# Patient Record
Sex: MALE | Race: WHITE | Employment: FULL TIME | URBAN - METROPOLITAN AREA
[De-identification: names, ages, dates, MRNs, and addresses within clinical notes are randomized per-mention and may not be internally consistent; named-entity substitution may affect disease eponyms.]

---

## 2022-02-24 ENCOUNTER — APPOINTMENT (OUTPATIENT)
Dept: GENERAL RADIOLOGY | Age: 72
DRG: 521 | End: 2022-02-24
Payer: COMMERCIAL

## 2022-02-24 ENCOUNTER — HOSPITAL ENCOUNTER (INPATIENT)
Age: 72
LOS: 3 days | Discharge: HOME HEALTH | DRG: 521 | End: 2022-02-27
Attending: INTERNAL MEDICINE | Admitting: INTERNAL MEDICINE
Payer: COMMERCIAL

## 2022-02-24 DIAGNOSIS — S72.002A CLOSED DISPLACED FRACTURE OF LEFT FEMORAL NECK (HCC): ICD-10-CM

## 2022-02-24 DIAGNOSIS — Z96.642 S/P HIP REPLACEMENT, LEFT: Primary | ICD-10-CM
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
## 2022-02-24 PROBLEM — N18.30 CKD (CHRONIC KIDNEY DISEASE), STAGE III (HCC): Status: ACTIVE | Noted: 2022-02-24

## 2022-02-24 PROBLEM — E11.9 TYPE 2 DIABETES MELLITUS (HCC): Status: ACTIVE | Noted: 2022-02-24

## 2022-02-24 PROBLEM — I10 ESSENTIAL HYPERTENSION: Status: ACTIVE | Noted: 2022-02-24

## 2022-02-24 LAB
ABO/RH: NORMAL
ANION GAP SERPL CALCULATED.3IONS-SCNC: 18 MMOL/L (ref 3–16)
ANTIBODY SCREEN: NORMAL
APTT: 33.2 SEC (ref 26.2–38.6)
BASOPHILS ABSOLUTE: 0.1 K/UL (ref 0–0.2)
BASOPHILS RELATIVE PERCENT: 0.4 %
BUN BLDV-MCNC: 38 MG/DL (ref 7–20)
CALCIUM SERPL-MCNC: 8.7 MG/DL (ref 8.3–10.6)
CHLORIDE BLD-SCNC: 101 MMOL/L (ref 99–110)
CO2: 19 MMOL/L (ref 21–32)
CREAT SERPL-MCNC: 1.9 MG/DL (ref 0.8–1.3)
EOSINOPHILS ABSOLUTE: 0.1 K/UL (ref 0–0.6)
EOSINOPHILS RELATIVE PERCENT: 0.8 %
GFR AFRICAN AMERICAN: 43
GFR NON-AFRICAN AMERICAN: 35
GLUCOSE BLD-MCNC: 164 MG/DL (ref 70–99)
HCT VFR BLD CALC: 33.5 % (ref 40.5–52.5)
HEMOGLOBIN: 11.4 G/DL (ref 13.5–17.5)
INR BLD: 0.97 (ref 0.88–1.12)
LYMPHOCYTES ABSOLUTE: 0.9 K/UL (ref 1–5.1)
LYMPHOCYTES RELATIVE PERCENT: 6.6 %
MCH RBC QN AUTO: 31.5 PG (ref 26–34)
MCHC RBC AUTO-ENTMCNC: 34.1 G/DL (ref 31–36)
MCV RBC AUTO: 92.4 FL (ref 80–100)
MONOCYTES ABSOLUTE: 0.9 K/UL (ref 0–1.3)
MONOCYTES RELATIVE PERCENT: 6.3 %
NEUTROPHILS ABSOLUTE: 12.2 K/UL (ref 1.7–7.7)
NEUTROPHILS RELATIVE PERCENT: 85.9 %
PDW BLD-RTO: 12.9 % (ref 12.4–15.4)
PLATELET # BLD: 231 K/UL (ref 135–450)
PMV BLD AUTO: 7.6 FL (ref 5–10.5)
POTASSIUM REFLEX MAGNESIUM: 4.7 MMOL/L (ref 3.5–5.1)
PROTHROMBIN TIME: 11 SEC (ref 9.9–12.7)
RBC # BLD: 3.62 M/UL (ref 4.2–5.9)
SARS-COV-2, NAAT: NOT DETECTED
SODIUM BLD-SCNC: 138 MMOL/L (ref 136–145)
WBC # BLD: 14.2 K/UL (ref 4–11)

## 2022-02-24 PROCEDURE — 96374 THER/PROPH/DIAG INJ IV PUSH: CPT

## 2022-02-24 PROCEDURE — 87635 SARS-COV-2 COVID-19 AMP PRB: CPT

## 2022-02-24 PROCEDURE — 86900 BLOOD TYPING SEROLOGIC ABO: CPT

## 2022-02-24 PROCEDURE — 85025 COMPLETE CBC W/AUTO DIFF WBC: CPT

## 2022-02-24 PROCEDURE — 99222 1ST HOSP IP/OBS MODERATE 55: CPT | Performed by: ORTHOPAEDIC SURGERY

## 2022-02-24 PROCEDURE — 80048 BASIC METABOLIC PNL TOTAL CA: CPT

## 2022-02-24 PROCEDURE — 2580000003 HC RX 258: Performed by: INTERNAL MEDICINE

## 2022-02-24 PROCEDURE — 96375 TX/PRO/DX INJ NEW DRUG ADDON: CPT

## 2022-02-24 PROCEDURE — 71045 X-RAY EXAM CHEST 1 VIEW: CPT

## 2022-02-24 PROCEDURE — 85730 THROMBOPLASTIN TIME PARTIAL: CPT

## 2022-02-24 PROCEDURE — 93005 ELECTROCARDIOGRAM TRACING: CPT | Performed by: INTERNAL MEDICINE

## 2022-02-24 PROCEDURE — 6360000002 HC RX W HCPCS: Performed by: INTERNAL MEDICINE

## 2022-02-24 PROCEDURE — 36415 COLL VENOUS BLD VENIPUNCTURE: CPT

## 2022-02-24 PROCEDURE — 73552 X-RAY EXAM OF FEMUR 2/>: CPT

## 2022-02-24 PROCEDURE — 6360000002 HC RX W HCPCS

## 2022-02-24 PROCEDURE — 6360000002 HC RX W HCPCS: Performed by: PHYSICIAN ASSISTANT

## 2022-02-24 PROCEDURE — 1200000000 HC SEMI PRIVATE

## 2022-02-24 PROCEDURE — 99284 EMERGENCY DEPT VISIT MOD MDM: CPT

## 2022-02-24 PROCEDURE — 6370000000 HC RX 637 (ALT 250 FOR IP): Performed by: INTERNAL MEDICINE

## 2022-02-24 PROCEDURE — 86901 BLOOD TYPING SEROLOGIC RH(D): CPT

## 2022-02-24 PROCEDURE — 73502 X-RAY EXAM HIP UNI 2-3 VIEWS: CPT

## 2022-02-24 PROCEDURE — 86850 RBC ANTIBODY SCREEN: CPT

## 2022-02-24 PROCEDURE — 85610 PROTHROMBIN TIME: CPT

## 2022-02-24 RX ORDER — ONDANSETRON 4 MG/1
4 TABLET, ORALLY DISINTEGRATING ORAL EVERY 8 HOURS PRN
Status: DISCONTINUED | OUTPATIENT
Start: 2022-02-24 | End: 2022-02-25

## 2022-02-24 RX ORDER — LOSARTAN POTASSIUM AND HYDROCHLOROTHIAZIDE 12.5; 1 MG/1; MG/1
1 TABLET ORAL DAILY
Status: ON HOLD | COMMUNITY
End: 2022-02-27 | Stop reason: HOSPADM

## 2022-02-24 RX ORDER — ONDANSETRON 2 MG/ML
4 INJECTION INTRAMUSCULAR; INTRAVENOUS EVERY 6 HOURS PRN
Status: DISCONTINUED | OUTPATIENT
Start: 2022-02-24 | End: 2022-02-25

## 2022-02-24 RX ORDER — SODIUM CHLORIDE 0.9 % (FLUSH) 0.9 %
5-40 SYRINGE (ML) INJECTION PRN
Status: DISCONTINUED | OUTPATIENT
Start: 2022-02-24 | End: 2022-02-25

## 2022-02-24 RX ORDER — HYDROCODONE BITARTRATE AND ACETAMINOPHEN 5; 325 MG/1; MG/1
1 TABLET ORAL EVERY 6 HOURS PRN
Status: DISCONTINUED | OUTPATIENT
Start: 2022-02-24 | End: 2022-02-25

## 2022-02-24 RX ORDER — LOSARTAN POTASSIUM 100 MG/1
100 TABLET ORAL DAILY
Status: DISCONTINUED | OUTPATIENT
Start: 2022-02-24 | End: 2022-02-25

## 2022-02-24 RX ORDER — SODIUM CHLORIDE 0.9 % (FLUSH) 0.9 %
5-40 SYRINGE (ML) INJECTION EVERY 12 HOURS SCHEDULED
Status: DISCONTINUED | OUTPATIENT
Start: 2022-02-24 | End: 2022-02-25

## 2022-02-24 RX ORDER — ONDANSETRON 2 MG/ML
4 INJECTION INTRAMUSCULAR; INTRAVENOUS ONCE
Status: COMPLETED | OUTPATIENT
Start: 2022-02-24 | End: 2022-02-24

## 2022-02-24 RX ORDER — FLASH GLUCOSE SENSOR
KIT MISCELLANEOUS
COMMUNITY

## 2022-02-24 RX ORDER — HYDROMORPHONE HYDROCHLORIDE 1 MG/ML
0.25 INJECTION, SOLUTION INTRAMUSCULAR; INTRAVENOUS; SUBCUTANEOUS
Status: DISCONTINUED | OUTPATIENT
Start: 2022-02-24 | End: 2022-02-25

## 2022-02-24 RX ORDER — SITAGLIPTIN AND METFORMIN HYDROCHLORIDE 100; 1000 MG/1; MG/1
100-1000 TABLET, FILM COATED, EXTENDED RELEASE ORAL DAILY
COMMUNITY

## 2022-02-24 RX ORDER — AMLODIPINE BESYLATE 5 MG/1
5 TABLET ORAL DAILY
Status: ON HOLD | COMMUNITY
End: 2022-02-27 | Stop reason: SDUPTHER

## 2022-02-24 RX ORDER — HYDROCHLOROTHIAZIDE 25 MG/1
12.5 TABLET ORAL DAILY
Status: DISCONTINUED | OUTPATIENT
Start: 2022-02-24 | End: 2022-02-25

## 2022-02-24 RX ORDER — SODIUM CHLORIDE 9 MG/ML
25 INJECTION, SOLUTION INTRAVENOUS PRN
Status: DISCONTINUED | OUTPATIENT
Start: 2022-02-24 | End: 2022-02-25

## 2022-02-24 RX ORDER — ACETAMINOPHEN 325 MG/1
650 TABLET ORAL EVERY 6 HOURS PRN
Status: DISCONTINUED | OUTPATIENT
Start: 2022-02-24 | End: 2022-02-25

## 2022-02-24 RX ORDER — ATORVASTATIN CALCIUM 20 MG/1
20 TABLET, FILM COATED ORAL DAILY
Status: DISCONTINUED | OUTPATIENT
Start: 2022-02-24 | End: 2022-02-27 | Stop reason: HOSPADM

## 2022-02-24 RX ORDER — AMLODIPINE BESYLATE 5 MG/1
5 TABLET ORAL DAILY
Status: DISCONTINUED | OUTPATIENT
Start: 2022-02-24 | End: 2022-02-25 | Stop reason: SDUPTHER

## 2022-02-24 RX ORDER — LISINOPRIL 10 MG/1
10 TABLET ORAL DAILY
Status: CANCELLED | OUTPATIENT
Start: 2022-02-24

## 2022-02-24 RX ORDER — HYDROMORPHONE HYDROCHLORIDE 1 MG/ML
0.5 INJECTION, SOLUTION INTRAMUSCULAR; INTRAVENOUS; SUBCUTANEOUS
Status: DISCONTINUED | OUTPATIENT
Start: 2022-02-24 | End: 2022-02-25

## 2022-02-24 RX ORDER — ACETAMINOPHEN 650 MG/1
650 SUPPOSITORY RECTAL EVERY 6 HOURS PRN
Status: DISCONTINUED | OUTPATIENT
Start: 2022-02-24 | End: 2022-02-25

## 2022-02-24 RX ORDER — POLYETHYLENE GLYCOL 3350 17 G/17G
17 POWDER, FOR SOLUTION ORAL DAILY PRN
Status: DISCONTINUED | OUTPATIENT
Start: 2022-02-24 | End: 2022-02-25

## 2022-02-24 RX ORDER — MORPHINE SULFATE 4 MG/ML
4 INJECTION, SOLUTION INTRAMUSCULAR; INTRAVENOUS ONCE
Status: COMPLETED | OUTPATIENT
Start: 2022-02-24 | End: 2022-02-24

## 2022-02-24 RX ADMIN — LOSARTAN POTASSIUM 100 MG: 100 TABLET, FILM COATED ORAL at 15:10

## 2022-02-24 RX ADMIN — MORPHINE SULFATE 4 MG: 4 INJECTION INTRAVENOUS at 09:38

## 2022-02-24 RX ADMIN — HYDROMORPHONE HYDROCHLORIDE 0.5 MG: 1 INJECTION, SOLUTION INTRAMUSCULAR; INTRAVENOUS; SUBCUTANEOUS at 15:13

## 2022-02-24 RX ADMIN — HYDROMORPHONE HYDROCHLORIDE 0.5 MG: 1 INJECTION, SOLUTION INTRAMUSCULAR; INTRAVENOUS; SUBCUTANEOUS at 12:19

## 2022-02-24 RX ADMIN — ONDANSETRON 4 MG: 2 INJECTION INTRAMUSCULAR; INTRAVENOUS at 09:37

## 2022-02-24 RX ADMIN — AMLODIPINE BESYLATE 5 MG: 5 TABLET ORAL at 15:10

## 2022-02-24 RX ADMIN — HYDROMORPHONE HYDROCHLORIDE 0.5 MG: 1 INJECTION, SOLUTION INTRAMUSCULAR; INTRAVENOUS; SUBCUTANEOUS at 18:40

## 2022-02-24 RX ADMIN — ATORVASTATIN CALCIUM 20 MG: 20 TABLET, FILM COATED ORAL at 15:10

## 2022-02-24 RX ADMIN — HYDROCHLOROTHIAZIDE 12.5 MG: 25 TABLET ORAL at 15:10

## 2022-02-24 RX ADMIN — SODIUM CHLORIDE, PRESERVATIVE FREE 10 ML: 5 INJECTION INTRAVENOUS at 21:24

## 2022-02-24 RX ADMIN — HYDROMORPHONE HYDROCHLORIDE 0.5 MG: 1 INJECTION, SOLUTION INTRAMUSCULAR; INTRAVENOUS; SUBCUTANEOUS at 21:23

## 2022-02-24 ASSESSMENT — PAIN DESCRIPTION - ORIENTATION
ORIENTATION: LEFT

## 2022-02-24 ASSESSMENT — ENCOUNTER SYMPTOMS
SHORTNESS OF BREATH: 0
DIARRHEA: 0
CONSTIPATION: 0
ABDOMINAL PAIN: 0
BACK PAIN: 0
VOMITING: 0
NAUSEA: 0
COLOR CHANGE: 0

## 2022-02-24 ASSESSMENT — PAIN - FUNCTIONAL ASSESSMENT: PAIN_FUNCTIONAL_ASSESSMENT: 0-10

## 2022-02-24 ASSESSMENT — PAIN DESCRIPTION - FREQUENCY
FREQUENCY: CONTINUOUS

## 2022-02-24 ASSESSMENT — PAIN DESCRIPTION - DESCRIPTORS
DESCRIPTORS: SHOOTING
DESCRIPTORS: SHOOTING
DESCRIPTORS: ACHING

## 2022-02-24 ASSESSMENT — PAIN SCALES - GENERAL
PAINLEVEL_OUTOF10: 7
PAINLEVEL_OUTOF10: 5
PAINLEVEL_OUTOF10: 3
PAINLEVEL_OUTOF10: 7
PAINLEVEL_OUTOF10: 6
PAINLEVEL_OUTOF10: 7
PAINLEVEL_OUTOF10: 5

## 2022-02-24 ASSESSMENT — PAIN DESCRIPTION - PROGRESSION
CLINICAL_PROGRESSION: GRADUALLY WORSENING
CLINICAL_PROGRESSION: GRADUALLY WORSENING

## 2022-02-24 ASSESSMENT — PAIN DESCRIPTION - ONSET: ONSET: ON-GOING

## 2022-02-24 ASSESSMENT — PAIN DESCRIPTION - LOCATION
LOCATION: HIP

## 2022-02-24 ASSESSMENT — PAIN DESCRIPTION - PAIN TYPE: TYPE: ACUTE PAIN

## 2022-02-24 NOTE — PLAN OF CARE
[address] [facility] Orthopedic Surgery  Plan of Care Note          Orthopedic Consult, full note to follow. Paco Back 80 y.o. admitted for left hip fracture post trip/fall. Xray reviewed, and showed femoral neck/subcapital fracture, high Pauwell's angle    Plan:  - Admitted and cleared by Hospitalist  - NPO at 24h00 tonight, for left KAREEM/Bipolar after hours  - Please add Type/Screen and PTT/INR    Thank you very much for the kind consultation and allowing me to participate in this patient's care. I will continue to keep you apprised of his progress.           I spoke to patient and wife by phone call at   To discuss diagnosis, treatment plan, recovery, risks/benefits  Plan for surgery KAREEM, ANDRZEJ, on 2/25/2022 after Leslie Henry MD , MD 2/24/2022 2:42 PM

## 2022-02-24 NOTE — CONSULTS
[facility] Orthopedic Surgery  Consult Note         This patient is seen in consultation at the request of Dr Naila Coelho MD and YANDY Casanova    Reason for Consult:  Left hip displaced femoral neck fracture. CHIEF COMPLAINT:  Left hip pain/ fall    History Obtained From:  patient, spouse, electronic medical record    HISTORY OF PRESENT ILLNESS:    Mr. Jaswant Aponte 80 y.o.  male seen today for consultation and evaluation of a left hip pain which occurred when he fell down steps at home. He is complaining of left hip pain. This is better with rest and worse with bearing any wt. The pain is sharp and not radiating. No numbness or tingling sensation. No other complaint. He was seen 1st at [facility] ED, came via EMS, where he was x-rayed and I was consulted. Past Medical History:        Diagnosis Date    Diabetes mellitus     Hypertension        Past Surgical History:        Procedure Laterality Date    ANKLE FRACTURE SURGERY         Medications prior to admission:   Prior to Admission medications    Medication Sig Start Date End Date Taking? Authorizing Provider   SITagliptin-metFORMIN HCl ER (JANUMET XR) 100-1000 MG TB24 Take 100-1,000 mg by mouth daily   Yes Historical Provider, MD   amLODIPine (NORVASC) 5 MG tablet Take 5 mg by mouth daily   Yes Historical Provider, MD   losartan-hydroCHLOROthiazide (HYZAAR) 100-12.5 MG per tablet Take 1 tablet by mouth daily   Yes Historical Provider, MD   Continuous Blood Gluc Sensor (FREESTYLE SHAINA 14 DAY SENSOR) MISC by Does not apply route   Yes Historical Provider, MD   atorvastatin (LIPITOR) 20 MG tablet Take 20 mg by mouth daily.      Yes Historical Provider, MD   glimepiride (AMARYL) 2 MG tablet Take 4 mg by mouth every morning (before breakfast)    Yes Historical Provider, MD       Current Medications:   Current Facility-Administered Medications: atorvastatin (LIPITOR) tablet 20 mg, 20 mg, Oral, Daily  sodium chloride flush 0.9 % injection 5-40 mL, 5-40 mL, IntraVENous, 2 times per day  sodium chloride flush 0.9 % injection 5-40 mL, 5-40 mL, IntraVENous, PRN  0.9 % sodium chloride infusion, 25 mL, IntraVENous, PRN  enoxaparin (LOVENOX) injection 40 mg, 40 mg, SubCUTAneous, Daily  ondansetron (ZOFRAN-ODT) disintegrating tablet 4 mg, 4 mg, Oral, Q8H PRN **OR** ondansetron (ZOFRAN) injection 4 mg, 4 mg, IntraVENous, Q6H PRN  polyethylene glycol (GLYCOLAX) packet 17 g, 17 g, Oral, Daily PRN  acetaminophen (TYLENOL) tablet 650 mg, 650 mg, Oral, Q6H PRN **OR** acetaminophen (TYLENOL) suppository 650 mg, 650 mg, Rectal, Q6H PRN  HYDROmorphone HCl PF (DILAUDID) injection 0.25 mg, 0.25 mg, IntraVENous, Q3H PRN **OR** HYDROmorphone HCl PF (DILAUDID) injection 0.5 mg, 0.5 mg, IntraVENous, Q3H PRN  HYDROcodone-acetaminophen (NORCO) 5-325 MG per tablet 1 tablet, 1 tablet, Oral, Q6H PRN  amLODIPine (NORVASC) tablet 5 mg, 5 mg, Oral, Daily  losartan (COZAAR) tablet 100 mg, 100 mg, Oral, Daily  hydroCHLOROthiazide (HYDRODIURIL) tablet 12.5 mg, 12.5 mg, Oral, Daily    Allergies:  Patient has no known allergies. Social History     Socioeconomic History    Marital status:      Spouse name: Not on file    Number of children: Not on file    Years of education: Not on file    Highest education level: Not on file   Occupational History    Not on file   Tobacco Use    Smoking status: Never Smoker    Smokeless tobacco: Not on file   Substance and Sexual Activity    Alcohol use:  Yes     Alcohol/week: 1.0 standard drink     Types: 1 Glasses of wine per week    Drug use: Not on file    Sexual activity: Not on file   Other Topics Concern    Not on file   Social History Narrative    Not on file     Social Determinants of Health     Financial Resource Strain:     Difficulty of Paying Living Expenses: Not on file   Food Insecurity:     Worried About Running Out of Food in the Last Year: Not on file    Angie of Food in the Last Year: Not on HJaimee Khan Needs:     Lack of Transportation (Medical): Not on file    Lack of Transportation (Non-Medical): Not on file   Physical Activity:     Days of Exercise per Week: Not on file    Minutes of Exercise per Session: Not on file   Stress:     Feeling of Stress : Not on file   Social Connections:     Frequency of Communication with Friends and Family: Not on file    Frequency of Social Gatherings with Friends and Family: Not on file    Attends Quaker Services: Not on file    Active Member of [address] [place] [address] or Organizations: Not on file    Attends Club or Organization Meetings: Not on file    Marital Status: Not on file   Intimate Partner Violence:     Fear of Current or Ex-Partner: Not on file    Emotionally Abused: Not on file    Physically Abused: Not on file    Sexually Abused: Not on file   Housing Stability:     Unable to Pay for Housing in the Last Year: Not on file    Number of [place] in the Last Year: Not on file    Unstable Housing in the Last Year: Not on file       Family History:  History reviewed and no pertinent family history. REVIEW OF SYSTEMS:   CONSTITUTIONAL: Denies unexplained weight loss, fevers, chills or fatigue  NEUROLOGICAL: Denies unsteady gait or progressive weakness    PSYCHOLOGICAL: Denies anxiety, depression   SKIN: Denies skin changes, delayed healing, rash, itching   HEMATOLOGIC: Denies easy bleeding or bruising  ENDOCRINE: Denies excessive thirst, urination, heat/cold  RESPIRATORY: Denies current dyspnea, cough  CARDIOVASCULAR: Negative for chest pain at this time. EYES: Negative for photophobia and visual disturbance. ENT:  Negative for rhinorrhea, epistaxis, sore throat, or hearing loss. GI: Denies nausea, vomiting, diarrhea   : Denies bowel or bladder issues   MUSCULOSKELETAL: Left hip pain. All other ROS reviewed in chart or with patient or family and are grossly negative.        PHYSICAL EXAMINATION:  Mr. Margie Williamson is a very pleasant 80 y.o. male who seen today in no acute distress, awake, alert, and oriented. He is well nourished and groomed. Patient with normal affect. Body mass index is 26.46 kg/m². . Skin warm and dry. Resting respiratory rate is 16. Resp deep and easy. Pulse is with regular rate and rhythm    BP (!) 156/91   Pulse 102   Temp 98.8 °F (37.1 °C) (Oral)   Resp 18   Ht 5' 8\" (1.727 m)   Wt 174 lb (78.9 kg)   SpO2 99%   BMI 26.46 kg/m²        Airway is intact  Chest: chest clear, no wheezing, rales, normal symmetric air entry, no tachypnea, retractions or cyanosis  Heart: regular rate and rhythm ; heart sounds normal   Hearing intact, pupil equal and reactive bilateral  Lymphatics; No groin or axillary enlarged lymph nodes. Neck; No swelling  Abdomen; soft, non distended. MUSCULOSKELETAL:   Left leg in external rotation with pain with ROM, Examination of both lower extrimiteis showing a decreased range of motion and muscle power of the left hip compare to the other side because of pain. There is mild swelling that can be seen, and ecchymosis over the left hip, but no wound. He has intact sensation and good pedal pulses bilateral.  He has significant tenderness on deep palpation over the left hip. Good strength, and no instability both upper and the other lower extremities. NVI bilateral lower and upper extermities.      NEUROLOGIC:   Sensory:    Touch:                     Right Upper Extremity:  normal                   Left Upper Extremity:  normal                  Right Lower Extremity:  normal                  Left Lower Extremity:  normal        DATA:    CBC:   Lab Results   Component Value Date    WBC 14.2 02/24/2022    RBC 3.62 02/24/2022    HGB 11.4 02/24/2022    HCT 33.5 02/24/2022    MCV 92.4 02/24/2022    MCH 31.5 02/24/2022    MCHC 34.1 02/24/2022    RDW 12.9 02/24/2022     02/24/2022    MPV 7.6 02/24/2022     WBC:    Lab Results   Component Value Date    WBC 14.2 02/24/2022     PT/INR:    Lab Results   Component Value Date    PROTIME 11.0 02/24/2022    INR 0.97 02/24/2022     PTT:    Lab Results   Component Value Date    APTT 33.2 02/24/2022   [APTT    IMAGING: X-rays were taken 2/24/2022, 3 views of the left hip and AP pelvis, was reviewed and showed a displaced left femoral neck fracture. IMPRESSION: Left hip displaced femoral neck fracture. PLAN:   I discussed the overall alignment of the fracture with the patient and his wife, and treatment options including both surgical and non-surgical treatment, and that my recommendation would be a total hip arthroplasty given the amount of displacement of the fracture and relatively active person. I discussed the risks and benefits of surgery with the patient, including but not limited to infection, bleeding, pain, injury to nerves or blood vessels failure of the surgery and need for additional surgery. All the patient's questions were answered. We discussed an expected post-operative course. She is understanding of this and wishes to proceed. Surgery tomorrow by Dr Edwin Noble if medically stable    Thank you very much for the kind consultation and allowing me to participate in this patient's care. I will continue to keep you apprised of her progress.         Diane James MD   2/24/2022  7:41 PM

## 2022-02-24 NOTE — ED PROVIDER NOTES
**EVALUATED BY CRISTINE**    [address] [facility]  eMERGENCY dEPARTMENT eNCOUnter    Pt Name: Lam Perez  MRN: [id number]  [place] 1950  Date of evaluation: 2/24/2022  Provider: YANDY Rdz    Chief Complaint:    Chief Complaint   Patient presents with    Fall     Pt brought to ED by ems from home after tripping down carpeted stairs inside his home. Pt denies hitting his head, denies loc. Has abrasion to left forearm. Pt c/o left hip pain, external rotation noted. Nursing Notes, Past Medical Hx, Past Surgical Hx, Social Hx, Allergies, and Family Hx were all reviewed and agreedwith or any disagreements were addressed in the HPI.    HPI:  (Location, Duration, Timing, Severity, Quality, Assoc Sx, Context, Modifying factors)  This is a  80 y.o. male history of diabetes and hypertension who presents to the emergency department with complaints of left hip/thigh pain after fall down 4-5 steps. This occurred at 4:00 this morning. Tripped down carpeted stairs. Mechanical fall. Denies hitting head or loss of consciousness. Does take an 81 mg aspirin daily. Has pain to left hip and thigh, 8 out of 10 in severity without radiation. States that he is unable to move his leg. No previous orthopedic injury or surgery to the left hip. Denies any neck or back pain. Abrasion to left forearm but denies any left arm pain. Pain aggravated with range of motion better at rest.  All other systems were reviewed and are negative. Past Medical/Surgical History:      Diagnosis Date    Diabetes mellitus     Hypertension          Procedure Laterality Date    ANKLE FRACTURE SURGERY         Medications:  Previous Medications    ATORVASTATIN (LIPITOR) 20 MG TABLET    Take 20 mg by mouth daily. GLIMEPIRIDE (AMARYL) 2 MG TABLET    Take 2 mg by mouth every morning (before breakfast). LISINOPRIL (PRINIVIL;ZESTRIL) 10 MG TABLET    Take 10 mg by mouth daily.            Review of Systems:  Review of Systems   Constitutional: Negative for chills and fever. Eyes: Negative for visual disturbance. Respiratory: Negative for shortness of breath. Cardiovascular: Negative for chest pain. Gastrointestinal: Negative for abdominal pain, constipation, diarrhea, nausea and vomiting. Musculoskeletal: Positive for arthralgias (left hip). Negative for back pain, joint swelling and myalgias. Skin: Positive for wound (abrasion, left arm). Negative for color change and rash. Neurological: Negative for weakness and numbness. All other systems reviewed and are negative. Positives and Pertinent negatives as per HPI. Except as noted above in the ROS, all other systems were reviewed/completed and are negative. Physical Exam:  Physical Exam  Vitals and nursing note reviewed. Constitutional:       Appearance: Normal appearance. He is well-developed. He is not toxic-appearing or diaphoretic. HENT:      Head: Normocephalic. Right Ear: External ear normal.      Left Ear: External ear normal.      Nose: Nose normal.   Eyes:      General:         Right eye: No discharge. Left eye: No discharge. Conjunctiva/sclera: Conjunctivae normal.   Cardiovascular:      Rate and Rhythm: Normal rate and regular rhythm. Heart sounds: Normal heart sounds. No murmur heard. No friction rub. No gallop. Comments: 2+ DP and PT pulse  Pulmonary:      Effort: Pulmonary effort is normal. No respiratory distress. Breath sounds: Normal breath sounds. No wheezing or rales. Musculoskeletal:         General: Tenderness present. Cervical back: Normal range of motion and neck supple. Comments: Left hip tenderness, decreased range of motion, left leg shortened and externally rotated. Skin:     General: Skin is warm and dry. Coloration: Skin is not pale. Neurological:      Mental Status: He is alert and oriented to person, place, and time.       Comments: Equal distal sensation of bilateral lower extremities. Psychiatric:         Behavior: Behavior normal. Behavior is cooperative. MEDICAL DECISION MAKING    Vitals:    Vitals:    02/24/22 0856   BP: (!) 195/89   Pulse: 93   Resp: 18   Temp: 99 °F (37.2 °C)   TempSrc: Oral   SpO2: 99%   Weight: 174 lb (78.9 kg)   Height: 5' 8\" (1.727 m)       LABS:   Results for orders placed or performed during the hospital encounter of 02/24/22   CBC with Auto Differential   Result Value Ref Range    WBC 14.2 (H) 4.0 - 11.0 K/uL    RBC 3.62 (L) 4.20 - 5.90 M/uL    Hemoglobin 11.4 (L) 13.5 - 17.5 g/dL    Hematocrit 33.5 (L) 40.5 - 52.5 %    MCV 92.4 80.0 - 100.0 fL    MCH 31.5 26.0 - 34.0 pg    MCHC 34.1 31.0 - 36.0 g/dL    RDW 12.9 12.4 - 15.4 %    Platelets 686 526 - 830 K/uL    MPV 7.6 5.0 - 10.5 fL    Neutrophils % 85.9 %    Lymphocytes % 6.6 %    Monocytes % 6.3 %    Eosinophils % 0.8 %    Basophils % 0.4 %    Neutrophils Absolute 12.2 (H) 1.7 - 7.7 K/uL    Lymphocytes Absolute 0.9 (L) 1.0 - 5.1 K/uL    Monocytes Absolute 0.9 0.0 - 1.3 K/uL    Eosinophils Absolute 0.1 0.0 - 0.6 K/uL    Basophils Absolute 0.1 0.0 - 0.2 K/uL   Basic Metabolic Panel w/ Reflex to MG   Result Value Ref Range    Sodium 138 136 - 145 mmol/L    Potassium reflex Magnesium 4.7 3.5 - 5.1 mmol/L    Chloride 101 99 - 110 mmol/L    CO2 19 (L) 21 - 32 mmol/L    Anion Gap 18 (H) 3 - 16    Glucose 164 (H) 70 - 99 mg/dL    BUN 38 (H) 7 - 20 mg/dL    CREATININE 1.9 (H) 0.8 - 1.3 mg/dL    GFR Non-African American 35 (A) >60    GFR  43 (A) >60    Calcium 8.7 8.3 - 10.6 mg/dL       Remainder of labs reviewed and were negative at this time or not returned at the time of this note.     RADIOLOGY:   Non-plain film images suchas CT, Ultrasound and MRI are read by the radiologist. Lydia KRAMER PA have directly visualized the radiologic plain film image(s) with the below findings:  Interpretation per the Radiologist below, if available at the time of this note:    XR CHEST PORTABLE   Final Result   No radiographic evidence of acute pulmonary disease. XR HIP 2-3 VW W PELVIS LEFT   Final Result   1. Acute left femoral neck fracture. XR FEMUR LEFT (MIN 2 VIEWS)   Final Result   1. Acute left femoral neck fracture. MEDICAL DECISION MAKING / ED COURSE:      PROCEDURES:   Procedures    Patient was given:  Medications   morphine injection 4 mg (4 mg IntraVENous Given 2/24/22 8190)   ondansetron (ZOFRAN) injection 4 mg (4 mg IntraVENous Given 2/24/22 4480)     Patient presents to the emergency department with fall down 4-5 steps, mechanical injury. Takes 81 mg aspirin. No other injury associated with today's evaluation of left hip and femur. Imaging of both will be obtained. Complains of 8 out of 10 pain, will receive IV morphine and Zofran. Normal peripheral pulses and distal sensation. Imaging does reveal a left femoral neck fracture. He has recent diagnosis of renal function abnormalities. Creatinine today of 1.9 and a BUN of 38. Blood sugar of 164. White count of 14.2. Covid tested ordered for rapid evaluation for surgical purposes. We will discuss with hospitalist for admission for left femoral neck fracture. Patient verbally agreeable with this plan of care. The patient tolerated their visit well. I have evaluated the patient with physician available for consultation as needed. I have discussed the findings of today's workup with the patient and addressed the patient's questions and concerns. CLINICAL IMPRESSION:  1. Closed displaced fracture of left femoral neck ([address]    2. Fall down stairs, initial encounter        DISPOSITION Decision To Admit 02/24/2022 10:39:52 AM      PATIENT REFERRED TO:  No follow-up provider specified.     DISCHARGE MEDICATIONS:  New Prescriptions    No medications on file              (Please note the MDM and HPI sections of this note were completed with avoice recognition program.  Efforts were made to edit the dictations but occasionally words are mis-transcribed.)    Electronically signed, YANDY Ross,             YANDY Joya  02/24/22 1042

## 2022-02-24 NOTE — PROGRESS NOTES
Pt arrived to unit (5T). VSS, BP elevated at 156/91 (giving daily home meds now). Wife at bedside, pt only complaining of pain 5 of 10 at this time. Oriented to room. No other needs expressed by pt or spouse at this time. Bed alarm on. Call light within reach. Will continue to monitor.

## 2022-02-24 NOTE — PROGRESS NOTES
Messaged Dr. Stan Chang (ortho) \"PTT, INR, and type and screen orders all added per Dr Prasad Montero request. He had mentioned IV fluids but I honestly did not catch exactly what and the rate? Lastly pt's wife Rashaad Chinchilla) is up here in the room very concerned with lots of questions about the planned procedure. Are you able to call and discuss with her (she keeps mentioning robotics and that [facility] can do that. ..). Her cell # is [phone]. Pt will be NPO after midnight. \"

## 2022-02-24 NOTE — H&P
Hospital Medicine History & Physical      PCP: Referring Not In System (Inactive)    Date of Admission: 2/24/2022    Date of Service: Pt seen/examined on 2/24/2022  and Admitted to Inpatient with expected LOS greater than two midnights due to medical therapy. Chief Complaint:    Chief Complaint   Patient presents with    Fall     Pt brought to ED by ems from home after tripping down carpeted stairs inside his home. Pt denies hitting his head, denies loc. Has abrasion to left forearm. Pt c/o left hip pain, external rotation noted. History Of Present Illness: The patient is a 80 y.o. male with history of hypertension, type 2 diabetes, stage III CKD who had a mechanical fall going down stairs at home slipping on the steps sustaining left hip injury. No loss of consciousness. He denies any chest pains, shortness of breath, palpitations, dizziness. No respiratory symptoms. No urinary symptoms. A left femur and pelvic x-ray noted for acute left femoral neck fracture  Chest x-ray clear lung fields    Past Medical History:        Diagnosis Date    Diabetes mellitus     Hypertension        Past Surgical History:        Procedure Laterality Date    ANKLE FRACTURE SURGERY         Medications Prior to Admission:    Prior to Admission medications    Medication Sig Start Date End Date Taking? Authorizing Provider   lisinopril (PRINIVIL;ZESTRIL) 10 MG tablet Take 10 mg by mouth daily. Historical Provider, MD   atorvastatin (LIPITOR) 20 MG tablet Take 20 mg by mouth daily. Historical Provider, MD   glimepiride (AMARYL) 2 MG tablet Take 2 mg by mouth every morning (before breakfast). Historical Provider, MD       Allergies:  Patient has no known allergies. Social History:  The patient currently lives with family    TOBACCO:   reports that he has never smoked. He does not have any smokeless tobacco history on file.   ETOH:   reports current alcohol use of about 1.0 standard drink of alcohol per week. Family History:  Reviewed in detail and negative for DM, Early CAD, Cancer, CVA. Positive as follows:    No family history on file. REVIEW OF SYSTEMS:   Positive for mechanical fall with left hip fracture and as noted in the HPI. All other systems reviewed and negative. PHYSICAL EXAM:    BP (!) 156/91   Pulse 102   Temp 98.8 °F (37.1 °C) (Oral)   Resp 18   Ht 5' 8\" (1.727 m)   Wt 174 lb (78.9 kg)   SpO2 99%   BMI 26.46 kg/m²     General appearance: No apparent distress appears stated age and cooperative. HEENT Normal cephalic, atraumatic without obvious deformity. Pupils equal, round, and reactive to light. Extra ocular muscles intact. Conjunctivae/corneas clear. Neck: Supple, No jugular venous distention/bruits. Lungs: Clear to auscultation, bilaterally without Rales/Wheezes/Rhonchi with good respiratory effort. Heart: Regular rate and rhythm with Normal S1/S2 without murmurs, rubs or gallops  Abdomen: Soft, non-tender or non-distended without rigidity or guarding and positive bowel sounds   Extremities: No clubbing, cyanosis, or edema bilaterally. Short, externally rotated left lower extremity  Skin: Skin color, texture, turgor normal.  No rashes or lesions. Neurologic: Alert and oriented X 3, neurovascularly intact with sensory/motor intact upper extremities/lower extremities, bilaterally. Cranial nerves: II-XII intact, grossly non-focal.  Mental status: Alert, oriented, thought content appropriate.         CBC   Recent Labs     02/24/22  1010   WBC 14.2*   HGB 11.4*   HCT 33.5*         RENAL  Recent Labs     02/24/22  1010      K 4.7      CO2 19*   BUN 38*   CREATININE 1.9*       Active Hospital Problems    Diagnosis Date Noted    Closed displaced fracture of left femoral neck ([facility] [S72.002A] 02/24/2022    CKD (chronic kidney disease), stage III (HCC) [N18.30] 02/24/2022    Type 2 diabetes mellitus ([facility] [E11.9] 02/24/2022    Essential hypertension [I10] 02/24/2022         ASSESSMENT/PLAN:   80 y.o. male with history of hypertension, type 2 diabetes, stage III CKD who had a mechanical fall going down stairs at home slipping on the steps sustaining left hip fracture. He is RCRI index score for 1 giving class II risk at 6% perioperative cardiopulmonary risk complication. METS>4. He Is an appropriate candidate for this intermediate procedure    Plan:  -Pain management with as needed IV Dilaudid  -IV hydration from midnight in view of surgery tomorrow  -N.p.o. from midnight  -Monitor renal function and electrolytes  -Continue other chronic home medication  -PT OT evaluations    DVT Prophylaxis: Subcut enoxaparin  Diet: ADULT DIET; Regular; 5 carb choices (75 gm/meal)  Code Status: Full Code       Callie Rivera MD    Thank you Referring Not In System (Inactive) for the opportunity to be involved in this patient's care. If you have any questions or concerns please feel free to contact me at [phone].

## 2022-02-25 ENCOUNTER — APPOINTMENT (OUTPATIENT)
Dept: GENERAL RADIOLOGY | Age: 72
DRG: 521 | End: 2022-02-25
Payer: COMMERCIAL

## 2022-02-25 ENCOUNTER — ANESTHESIA EVENT (OUTPATIENT)
Dept: OPERATING ROOM | Age: 72
DRG: 521 | End: 2022-02-25
Payer: COMMERCIAL

## 2022-02-25 ENCOUNTER — ANESTHESIA (OUTPATIENT)
Dept: OPERATING ROOM | Age: 72
DRG: 521 | End: 2022-02-25
Payer: COMMERCIAL

## 2022-02-25 ENCOUNTER — APPOINTMENT (OUTPATIENT)
Dept: ULTRASOUND IMAGING | Age: 72
DRG: 521 | End: 2022-02-25
Payer: COMMERCIAL

## 2022-02-25 VITALS
DIASTOLIC BLOOD PRESSURE: 93 MMHG | RESPIRATION RATE: 14 BRPM | SYSTOLIC BLOOD PRESSURE: 166 MMHG | OXYGEN SATURATION: 90 % | TEMPERATURE: 98.4 F

## 2022-02-25 LAB
ANION GAP SERPL CALCULATED.3IONS-SCNC: 12 MMOL/L (ref 3–16)
BASOPHILS ABSOLUTE: 0.1 K/UL (ref 0–0.2)
BASOPHILS RELATIVE PERCENT: 0.6 %
BILIRUBIN URINE: NEGATIVE
BLOOD, URINE: ABNORMAL
BUN BLDV-MCNC: 38 MG/DL (ref 7–20)
CALCIUM SERPL-MCNC: 9.3 MG/DL (ref 8.3–10.6)
CHLORIDE BLD-SCNC: 102 MMOL/L (ref 99–110)
CLARITY: CLEAR
CO2: 26 MMOL/L (ref 21–32)
COLOR: YELLOW
CREAT SERPL-MCNC: 2.2 MG/DL (ref 0.8–1.3)
CREATININE URINE: 124.4 MG/DL (ref 39–259)
EKG ATRIAL RATE: 104 BPM
EKG DIAGNOSIS: NORMAL
EKG P AXIS: 22 DEGREES
EKG P-R INTERVAL: 168 MS
EKG Q-T INTERVAL: 354 MS
EKG QRS DURATION: 78 MS
EKG QTC CALCULATION (BAZETT): 465 MS
EKG R AXIS: -2 DEGREES
EKG T AXIS: 43 DEGREES
EKG VENTRICULAR RATE: 104 BPM
EOSINOPHILS ABSOLUTE: 0.7 K/UL (ref 0–0.6)
EOSINOPHILS RELATIVE PERCENT: 6.3 %
EPITHELIAL CELLS, UA: 0 /HPF (ref 0–5)
GFR AFRICAN AMERICAN: 36
GFR NON-AFRICAN AMERICAN: 30
GLUCOSE BLD-MCNC: 120 MG/DL (ref 70–99)
GLUCOSE BLD-MCNC: 143 MG/DL (ref 70–99)
GLUCOSE BLD-MCNC: 166 MG/DL (ref 70–99)
GLUCOSE BLD-MCNC: 225 MG/DL (ref 70–99)
GLUCOSE URINE: 100 MG/DL
HCT VFR BLD CALC: 36.4 % (ref 40.5–52.5)
HEMOGLOBIN: 12.4 G/DL (ref 13.5–17.5)
HYALINE CASTS: 1 /LPF (ref 0–8)
KETONES, URINE: NEGATIVE MG/DL
LEUKOCYTE ESTERASE, URINE: NEGATIVE
LYMPHOCYTES ABSOLUTE: 1.7 K/UL (ref 1–5.1)
LYMPHOCYTES RELATIVE PERCENT: 15.9 %
MCH RBC QN AUTO: 31.8 PG (ref 26–34)
MCHC RBC AUTO-ENTMCNC: 34.1 G/DL (ref 31–36)
MCV RBC AUTO: 93.1 FL (ref 80–100)
MICROSCOPIC EXAMINATION: YES
MONOCYTES ABSOLUTE: 1.2 K/UL (ref 0–1.3)
MONOCYTES RELATIVE PERCENT: 11 %
NEUTROPHILS ABSOLUTE: 7 K/UL (ref 1.7–7.7)
NEUTROPHILS RELATIVE PERCENT: 66.2 %
NITRITE, URINE: NEGATIVE
PDW BLD-RTO: 12.9 % (ref 12.4–15.4)
PERFORMED ON: ABNORMAL
PH UA: 5 (ref 5–8)
PLATELET # BLD: 255 K/UL (ref 135–450)
PMV BLD AUTO: 7.9 FL (ref 5–10.5)
POTASSIUM REFLEX MAGNESIUM: 4.1 MMOL/L (ref 3.5–5.1)
PROTEIN UA: 100 MG/DL
RBC # BLD: 3.91 M/UL (ref 4.2–5.9)
RBC UA: 2 /HPF (ref 0–4)
SODIUM BLD-SCNC: 140 MMOL/L (ref 136–145)
SODIUM URINE: 91 MMOL/L
SPECIFIC GRAVITY UA: 1.02 (ref 1–1.03)
TOTAL CK: 101 U/L (ref 39–308)
URINE TYPE: ABNORMAL
UROBILINOGEN, URINE: 0.2 E.U./DL
WBC # BLD: 10.5 K/UL (ref 4–11)
WBC UA: 2 /HPF (ref 0–5)

## 2022-02-25 PROCEDURE — 81001 URINALYSIS AUTO W/SCOPE: CPT

## 2022-02-25 PROCEDURE — 73501 X-RAY EXAM HIP UNI 1 VIEW: CPT

## 2022-02-25 PROCEDURE — 85025 COMPLETE CBC W/AUTO DIFF WBC: CPT

## 2022-02-25 PROCEDURE — 2580000003 HC RX 258: Performed by: INTERNAL MEDICINE

## 2022-02-25 PROCEDURE — 3600000005 HC SURGERY LEVEL 5 BASE: Performed by: ORTHOPAEDIC SURGERY

## 2022-02-25 PROCEDURE — 6360000002 HC RX W HCPCS: Performed by: ORTHOPAEDIC SURGERY

## 2022-02-25 PROCEDURE — 51798 US URINE CAPACITY MEASURE: CPT

## 2022-02-25 PROCEDURE — 2580000003 HC RX 258: Performed by: ORTHOPAEDIC SURGERY

## 2022-02-25 PROCEDURE — 7100000000 HC PACU RECOVERY - FIRST 15 MIN: Performed by: ORTHOPAEDIC SURGERY

## 2022-02-25 PROCEDURE — 94760 N-INVAS EAR/PLS OXIMETRY 1: CPT

## 2022-02-25 PROCEDURE — 1200000000 HC SEMI PRIVATE

## 2022-02-25 PROCEDURE — 3600000015 HC SURGERY LEVEL 5 ADDTL 15MIN: Performed by: ORTHOPAEDIC SURGERY

## 2022-02-25 PROCEDURE — 0SRB02A REPLACEMENT OF LEFT HIP JOINT WITH METAL ON POLYETHYLENE SYNTHETIC SUBSTITUTE, UNCEMENTED, OPEN APPROACH: ICD-10-PCS | Performed by: ORTHOPAEDIC SURGERY

## 2022-02-25 PROCEDURE — 7100000001 HC PACU RECOVERY - ADDTL 15 MIN: Performed by: ORTHOPAEDIC SURGERY

## 2022-02-25 PROCEDURE — 84300 ASSAY OF URINE SODIUM: CPT

## 2022-02-25 PROCEDURE — 6370000000 HC RX 637 (ALT 250 FOR IP): Performed by: INTERNAL MEDICINE

## 2022-02-25 PROCEDURE — 82570 ASSAY OF URINE CREATININE: CPT

## 2022-02-25 PROCEDURE — 2500000003 HC RX 250 WO HCPCS: Performed by: ORTHOPAEDIC SURGERY

## 2022-02-25 PROCEDURE — 6360000002 HC RX W HCPCS: Performed by: NURSE ANESTHETIST, CERTIFIED REGISTERED

## 2022-02-25 PROCEDURE — 93010 ELECTROCARDIOGRAM REPORT: CPT | Performed by: INTERNAL MEDICINE

## 2022-02-25 PROCEDURE — 3700000001 HC ADD 15 MINUTES (ANESTHESIA): Performed by: ORTHOPAEDIC SURGERY

## 2022-02-25 PROCEDURE — 72170 X-RAY EXAM OF PELVIS: CPT

## 2022-02-25 PROCEDURE — 2500000003 HC RX 250 WO HCPCS: Performed by: ANESTHESIOLOGY

## 2022-02-25 PROCEDURE — 80048 BASIC METABOLIC PNL TOTAL CA: CPT

## 2022-02-25 PROCEDURE — 2709999900 HC NON-CHARGEABLE SUPPLY: Performed by: ORTHOPAEDIC SURGERY

## 2022-02-25 PROCEDURE — 6360000002 HC RX W HCPCS: Performed by: INTERNAL MEDICINE

## 2022-02-25 PROCEDURE — 82550 ASSAY OF CK (CPK): CPT

## 2022-02-25 PROCEDURE — 2500000003 HC RX 250 WO HCPCS: Performed by: NURSE ANESTHETIST, CERTIFIED REGISTERED

## 2022-02-25 PROCEDURE — 3700000000 HC ANESTHESIA ATTENDED CARE: Performed by: ORTHOPAEDIC SURGERY

## 2022-02-25 PROCEDURE — 36415 COLL VENOUS BLD VENIPUNCTURE: CPT

## 2022-02-25 PROCEDURE — 2720000010 HC SURG SUPPLY STERILE: Performed by: ORTHOPAEDIC SURGERY

## 2022-02-25 PROCEDURE — 2700000000 HC OXYGEN THERAPY PER DAY

## 2022-02-25 PROCEDURE — C1776 JOINT DEVICE (IMPLANTABLE): HCPCS | Performed by: ORTHOPAEDIC SURGERY

## 2022-02-25 PROCEDURE — 6370000000 HC RX 637 (ALT 250 FOR IP): Performed by: ORTHOPAEDIC SURGERY

## 2022-02-25 PROCEDURE — A4217 STERILE WATER/SALINE, 500 ML: HCPCS | Performed by: ORTHOPAEDIC SURGERY

## 2022-02-25 PROCEDURE — 94761 N-INVAS EAR/PLS OXIMETRY MLT: CPT

## 2022-02-25 PROCEDURE — 76770 US EXAM ABDO BACK WALL COMP: CPT

## 2022-02-25 DEVICE — HEAD FEM DIA36MM NK L+7MM HIP BIOLOX DELT CEM PRI PRESSFIT: Type: IMPLANTABLE DEVICE | Site: FEMUR | Status: FUNCTIONAL

## 2022-02-25 DEVICE — STEM FEM STD OFFSET 5 HIP HA AVENIR COMPLETE: Type: IMPLANTABLE DEVICE | Site: FEMUR | Status: FUNCTIONAL

## 2022-02-25 DEVICE — SHELL ACET SZ E DIA52MM 3 H OSSEOTI LIMIT H 2 MOBILITY G7: Type: IMPLANTABLE DEVICE | Site: ACETABULUM | Status: FUNCTIONAL

## 2022-02-25 DEVICE — G7 VIT E NEUTRAL LNR 36MM E: Type: IMPLANTABLE DEVICE | Site: ACETABULUM | Status: FUNCTIONAL

## 2022-02-25 DEVICE — BONE SCREW 6.5X30 SELF-TAP: Type: IMPLANTABLE DEVICE | Site: ACETABULUM | Status: FUNCTIONAL

## 2022-02-25 RX ORDER — DEXTROSE MONOHYDRATE 25 G/50ML
12.5 INJECTION, SOLUTION INTRAVENOUS PRN
Status: DISCONTINUED | OUTPATIENT
Start: 2022-02-25 | End: 2022-02-25 | Stop reason: SDUPTHER

## 2022-02-25 RX ORDER — MORPHINE SULFATE 4 MG/ML
4 INJECTION, SOLUTION INTRAMUSCULAR; INTRAVENOUS
Status: DISCONTINUED | OUTPATIENT
Start: 2022-02-25 | End: 2022-02-27 | Stop reason: HOSPADM

## 2022-02-25 RX ORDER — LOSARTAN POTASSIUM AND HYDROCHLOROTHIAZIDE 12.5; 1 MG/1; MG/1
1 TABLET ORAL DAILY
Status: DISCONTINUED | OUTPATIENT
Start: 2022-02-26 | End: 2022-02-25 | Stop reason: CLARIF

## 2022-02-25 RX ORDER — TRANEXAMIC ACID 100 MG/ML
INJECTION, SOLUTION INTRAVENOUS PRN
Status: DISCONTINUED | OUTPATIENT
Start: 2022-02-25 | End: 2022-02-25 | Stop reason: SDUPTHER

## 2022-02-25 RX ORDER — SODIUM CHLORIDE 0.9 % (FLUSH) 0.9 %
10 SYRINGE (ML) INJECTION EVERY 12 HOURS SCHEDULED
Status: DISCONTINUED | OUTPATIENT
Start: 2022-02-25 | End: 2022-02-27 | Stop reason: HOSPADM

## 2022-02-25 RX ORDER — LOSARTAN POTASSIUM 100 MG/1
100 TABLET ORAL DAILY
Status: DISCONTINUED | OUTPATIENT
Start: 2022-02-26 | End: 2022-02-26

## 2022-02-25 RX ORDER — HYDROMORPHONE HCL 110MG/55ML
0.5 PATIENT CONTROLLED ANALGESIA SYRINGE INTRAVENOUS EVERY 5 MIN PRN
Status: DISCONTINUED | OUTPATIENT
Start: 2022-02-25 | End: 2022-02-25 | Stop reason: HOSPADM

## 2022-02-25 RX ORDER — ACETAMINOPHEN 325 MG/1
650 TABLET ORAL EVERY 6 HOURS
Status: DISCONTINUED | OUTPATIENT
Start: 2022-02-25 | End: 2022-02-27 | Stop reason: HOSPADM

## 2022-02-25 RX ORDER — SODIUM CHLORIDE 9 MG/ML
25 INJECTION, SOLUTION INTRAVENOUS PRN
Status: DISCONTINUED | OUTPATIENT
Start: 2022-02-25 | End: 2022-02-27 | Stop reason: HOSPADM

## 2022-02-25 RX ORDER — ONDANSETRON 4 MG/1
4 TABLET, ORALLY DISINTEGRATING ORAL EVERY 8 HOURS PRN
Status: DISCONTINUED | OUTPATIENT
Start: 2022-02-25 | End: 2022-02-27 | Stop reason: HOSPADM

## 2022-02-25 RX ORDER — ASPIRIN 81 MG/1
81 TABLET ORAL 2 TIMES DAILY
Status: DISCONTINUED | OUTPATIENT
Start: 2022-02-26 | End: 2022-02-27 | Stop reason: HOSPADM

## 2022-02-25 RX ORDER — AMLODIPINE BESYLATE 5 MG/1
5 TABLET ORAL DAILY
Status: DISCONTINUED | OUTPATIENT
Start: 2022-02-26 | End: 2022-02-27 | Stop reason: HOSPADM

## 2022-02-25 RX ORDER — OXYCODONE HYDROCHLORIDE 5 MG/1
5 TABLET ORAL EVERY 4 HOURS PRN
Status: DISCONTINUED | OUTPATIENT
Start: 2022-02-25 | End: 2022-02-27 | Stop reason: HOSPADM

## 2022-02-25 RX ORDER — SODIUM CHLORIDE 0.9 % (FLUSH) 0.9 %
5-40 SYRINGE (ML) INJECTION EVERY 12 HOURS SCHEDULED
Status: DISCONTINUED | OUTPATIENT
Start: 2022-02-25 | End: 2022-02-25 | Stop reason: HOSPADM

## 2022-02-25 RX ORDER — PROPOFOL 10 MG/ML
INJECTION, EMULSION INTRAVENOUS PRN
Status: DISCONTINUED | OUTPATIENT
Start: 2022-02-25 | End: 2022-02-25 | Stop reason: SDUPTHER

## 2022-02-25 RX ORDER — PHENYLEPHRINE HCL IN 0.9% NACL 1 MG/10 ML
SYRINGE (ML) INTRAVENOUS PRN
Status: DISCONTINUED | OUTPATIENT
Start: 2022-02-25 | End: 2022-02-25 | Stop reason: SDUPTHER

## 2022-02-25 RX ORDER — DEXTROSE MONOHYDRATE 50 MG/ML
100 INJECTION, SOLUTION INTRAVENOUS PRN
Status: DISCONTINUED | OUTPATIENT
Start: 2022-02-25 | End: 2022-02-27 | Stop reason: HOSPADM

## 2022-02-25 RX ORDER — NICOTINE POLACRILEX 4 MG
15 LOZENGE BUCCAL PRN
Status: DISCONTINUED | OUTPATIENT
Start: 2022-02-25 | End: 2022-02-27 | Stop reason: HOSPADM

## 2022-02-25 RX ORDER — MAGNESIUM HYDROXIDE 1200 MG/15ML
LIQUID ORAL CONTINUOUS PRN
Status: COMPLETED | OUTPATIENT
Start: 2022-02-25 | End: 2022-02-25

## 2022-02-25 RX ORDER — SUCCINYLCHOLINE/SOD CL,ISO/PF 200MG/10ML
SYRINGE (ML) INTRAVENOUS PRN
Status: DISCONTINUED | OUTPATIENT
Start: 2022-02-25 | End: 2022-02-25 | Stop reason: SDUPTHER

## 2022-02-25 RX ORDER — GLIMEPIRIDE 2 MG/1
4 TABLET ORAL
Status: DISCONTINUED | OUTPATIENT
Start: 2022-02-26 | End: 2022-02-27 | Stop reason: HOSPADM

## 2022-02-25 RX ORDER — INSULIN LISPRO 100 [IU]/ML
0-12 INJECTION, SOLUTION INTRAVENOUS; SUBCUTANEOUS
Status: DISCONTINUED | OUTPATIENT
Start: 2022-02-26 | End: 2022-02-27 | Stop reason: HOSPADM

## 2022-02-25 RX ORDER — SODIUM CHLORIDE 9 MG/ML
25 INJECTION, SOLUTION INTRAVENOUS PRN
Status: DISCONTINUED | OUTPATIENT
Start: 2022-02-25 | End: 2022-02-25 | Stop reason: HOSPADM

## 2022-02-25 RX ORDER — WATER FOR INJ.,BACTERIOSTATIC
VIAL (ML) INJECTION
Status: COMPLETED | OUTPATIENT
Start: 2022-02-25 | End: 2022-02-25

## 2022-02-25 RX ORDER — DEXAMETHASONE SODIUM PHOSPHATE 4 MG/ML
INJECTION, SOLUTION INTRA-ARTICULAR; INTRALESIONAL; INTRAMUSCULAR; INTRAVENOUS; SOFT TISSUE PRN
Status: DISCONTINUED | OUTPATIENT
Start: 2022-02-25 | End: 2022-02-25 | Stop reason: SDUPTHER

## 2022-02-25 RX ORDER — SODIUM CHLORIDE 0.9 % (FLUSH) 0.9 %
5-40 SYRINGE (ML) INJECTION PRN
Status: DISCONTINUED | OUTPATIENT
Start: 2022-02-25 | End: 2022-02-25 | Stop reason: HOSPADM

## 2022-02-25 RX ORDER — MEPERIDINE HYDROCHLORIDE 25 MG/ML
12.5 INJECTION INTRAMUSCULAR; INTRAVENOUS; SUBCUTANEOUS EVERY 5 MIN PRN
Status: DISCONTINUED | OUTPATIENT
Start: 2022-02-25 | End: 2022-02-25 | Stop reason: HOSPADM

## 2022-02-25 RX ORDER — FENTANYL CITRATE 50 UG/ML
INJECTION, SOLUTION INTRAMUSCULAR; INTRAVENOUS PRN
Status: DISCONTINUED | OUTPATIENT
Start: 2022-02-25 | End: 2022-02-25 | Stop reason: SDUPTHER

## 2022-02-25 RX ORDER — SENNA AND DOCUSATE SODIUM 50; 8.6 MG/1; MG/1
1 TABLET, FILM COATED ORAL 2 TIMES DAILY
Status: DISCONTINUED | OUTPATIENT
Start: 2022-02-25 | End: 2022-02-27 | Stop reason: HOSPADM

## 2022-02-25 RX ORDER — MORPHINE SULFATE 2 MG/ML
2 INJECTION, SOLUTION INTRAMUSCULAR; INTRAVENOUS
Status: DISCONTINUED | OUTPATIENT
Start: 2022-02-25 | End: 2022-02-27 | Stop reason: HOSPADM

## 2022-02-25 RX ORDER — ONDANSETRON 2 MG/ML
INJECTION INTRAMUSCULAR; INTRAVENOUS PRN
Status: DISCONTINUED | OUTPATIENT
Start: 2022-02-25 | End: 2022-02-25 | Stop reason: SDUPTHER

## 2022-02-25 RX ORDER — ONDANSETRON 2 MG/ML
4 INJECTION INTRAMUSCULAR; INTRAVENOUS
Status: DISCONTINUED | OUTPATIENT
Start: 2022-02-25 | End: 2022-02-25 | Stop reason: HOSPADM

## 2022-02-25 RX ORDER — SODIUM CHLORIDE 9 MG/ML
INJECTION, SOLUTION INTRAVENOUS CONTINUOUS
Status: DISCONTINUED | OUTPATIENT
Start: 2022-02-25 | End: 2022-02-27

## 2022-02-25 RX ORDER — ROCURONIUM BROMIDE 10 MG/ML
INJECTION, SOLUTION INTRAVENOUS PRN
Status: DISCONTINUED | OUTPATIENT
Start: 2022-02-25 | End: 2022-02-25 | Stop reason: SDUPTHER

## 2022-02-25 RX ORDER — HYDROXYZINE HYDROCHLORIDE 10 MG/1
10 TABLET, FILM COATED ORAL EVERY 8 HOURS PRN
Status: DISCONTINUED | OUTPATIENT
Start: 2022-02-25 | End: 2022-02-27 | Stop reason: HOSPADM

## 2022-02-25 RX ORDER — INSULIN LISPRO 100 [IU]/ML
0-6 INJECTION, SOLUTION INTRAVENOUS; SUBCUTANEOUS NIGHTLY
Status: DISCONTINUED | OUTPATIENT
Start: 2022-02-25 | End: 2022-02-27 | Stop reason: HOSPADM

## 2022-02-25 RX ORDER — SODIUM CHLORIDE 0.9 % (FLUSH) 0.9 %
10 SYRINGE (ML) INJECTION PRN
Status: DISCONTINUED | OUTPATIENT
Start: 2022-02-25 | End: 2022-02-27 | Stop reason: HOSPADM

## 2022-02-25 RX ORDER — LIDOCAINE HYDROCHLORIDE 20 MG/ML
INJECTION, SOLUTION EPIDURAL; INFILTRATION; INTRACAUDAL; PERINEURAL PRN
Status: DISCONTINUED | OUTPATIENT
Start: 2022-02-25 | End: 2022-02-25 | Stop reason: SDUPTHER

## 2022-02-25 RX ORDER — ONDANSETRON 2 MG/ML
4 INJECTION INTRAMUSCULAR; INTRAVENOUS EVERY 6 HOURS PRN
Status: DISCONTINUED | OUTPATIENT
Start: 2022-02-25 | End: 2022-02-27 | Stop reason: HOSPADM

## 2022-02-25 RX ORDER — OXYCODONE HYDROCHLORIDE 5 MG/1
10 TABLET ORAL EVERY 4 HOURS PRN
Status: DISCONTINUED | OUTPATIENT
Start: 2022-02-25 | End: 2022-02-27 | Stop reason: HOSPADM

## 2022-02-25 RX ORDER — FAMOTIDINE 20 MG/1
20 TABLET, FILM COATED ORAL DAILY
Status: DISCONTINUED | OUTPATIENT
Start: 2022-02-26 | End: 2022-02-27 | Stop reason: HOSPADM

## 2022-02-25 RX ORDER — POLYETHYLENE GLYCOL 3350 17 G/17G
17 POWDER, FOR SOLUTION ORAL DAILY
Status: DISCONTINUED | OUTPATIENT
Start: 2022-02-26 | End: 2022-02-27 | Stop reason: HOSPADM

## 2022-02-25 RX ORDER — HYDROCHLOROTHIAZIDE 25 MG/1
12.5 TABLET ORAL DAILY
Status: DISCONTINUED | OUTPATIENT
Start: 2022-02-26 | End: 2022-02-26

## 2022-02-25 RX ORDER — LISINOPRIL 10 MG/1
10 TABLET ORAL DAILY
Status: DISCONTINUED | OUTPATIENT
Start: 2022-02-26 | End: 2022-02-25

## 2022-02-25 RX ADMIN — SUGAMMADEX 200 MG: 100 INJECTION, SOLUTION INTRAVENOUS at 19:17

## 2022-02-25 RX ADMIN — HYDROMORPHONE HYDROCHLORIDE 0.5 MG: 1 INJECTION, SOLUTION INTRAMUSCULAR; INTRAVENOUS; SUBCUTANEOUS at 10:31

## 2022-02-25 RX ADMIN — SODIUM CHLORIDE, PRESERVATIVE FREE 10 ML: 5 INJECTION INTRAVENOUS at 08:48

## 2022-02-25 RX ADMIN — MORPHINE SULFATE 4 MG: 4 INJECTION INTRAVENOUS at 21:52

## 2022-02-25 RX ADMIN — SODIUM CHLORIDE: 9 INJECTION, SOLUTION INTRAVENOUS at 19:59

## 2022-02-25 RX ADMIN — CEFAZOLIN 1000 MG: 1 INJECTION, POWDER, FOR SOLUTION INTRAVENOUS at 17:15

## 2022-02-25 RX ADMIN — Medication 200 MCG: at 17:42

## 2022-02-25 RX ADMIN — ONDANSETRON 4 MG: 2 INJECTION INTRAMUSCULAR; INTRAVENOUS at 19:02

## 2022-02-25 RX ADMIN — SODIUM CHLORIDE, PRESERVATIVE FREE 10 ML: 5 INJECTION INTRAVENOUS at 22:07

## 2022-02-25 RX ADMIN — Medication 100 MCG: at 17:37

## 2022-02-25 RX ADMIN — AMLODIPINE BESYLATE 5 MG: 5 TABLET ORAL at 08:47

## 2022-02-25 RX ADMIN — ATORVASTATIN CALCIUM 20 MG: 20 TABLET, FILM COATED ORAL at 08:47

## 2022-02-25 RX ADMIN — SENNOSIDES AND DOCUSATE SODIUM 1 TABLET: 50; 8.6 TABLET ORAL at 22:11

## 2022-02-25 RX ADMIN — ENOXAPARIN SODIUM 40 MG: 40 INJECTION SUBCUTANEOUS at 08:47

## 2022-02-25 RX ADMIN — ACETAMINOPHEN 325MG 650 MG: 325 TABLET ORAL at 21:52

## 2022-02-25 RX ADMIN — BENZOCAINE 1 LOZENGE: 3.6; 15 LOZENGE ORAL at 22:01

## 2022-02-25 RX ADMIN — LIDOCAINE HYDROCHLORIDE 100 MG: 20 INJECTION, SOLUTION EPIDURAL; INFILTRATION; INTRACAUDAL; PERINEURAL at 17:21

## 2022-02-25 RX ADMIN — HYDROCODONE BITARTRATE AND ACETAMINOPHEN 1 TABLET: 5; 325 TABLET ORAL at 01:14

## 2022-02-25 RX ADMIN — HYDROMORPHONE HYDROCHLORIDE 0.5 MG: 1 INJECTION, SOLUTION INTRAMUSCULAR; INTRAVENOUS; SUBCUTANEOUS at 13:53

## 2022-02-25 RX ADMIN — SODIUM CHLORIDE: 9 INJECTION, SOLUTION INTRAVENOUS at 16:20

## 2022-02-25 RX ADMIN — HYDROCODONE BITARTRATE AND ACETAMINOPHEN 1 TABLET: 5; 325 TABLET ORAL at 08:55

## 2022-02-25 RX ADMIN — Medication 140 MG: at 17:21

## 2022-02-25 RX ADMIN — DEXAMETHASONE SODIUM PHOSPHATE 8 MG: 4 INJECTION, SOLUTION INTRAMUSCULAR; INTRAVENOUS at 17:34

## 2022-02-25 RX ADMIN — HYDROMORPHONE HYDROCHLORIDE 0.5 MG: 1 INJECTION, SOLUTION INTRAMUSCULAR; INTRAVENOUS; SUBCUTANEOUS at 00:06

## 2022-02-25 RX ADMIN — ROCURONIUM BROMIDE 10 MG: 10 INJECTION, SOLUTION INTRAVENOUS at 17:21

## 2022-02-25 RX ADMIN — FENTANYL CITRATE 50 MCG: 50 INJECTION, SOLUTION INTRAMUSCULAR; INTRAVENOUS at 17:20

## 2022-02-25 RX ADMIN — HYDROMORPHONE HYDROCHLORIDE 0.5 MG: 1 INJECTION, SOLUTION INTRAMUSCULAR; INTRAVENOUS; SUBCUTANEOUS at 03:33

## 2022-02-25 RX ADMIN — HYDROMORPHONE HYDROCHLORIDE 0.5 MG: 1 INJECTION, SOLUTION INTRAMUSCULAR; INTRAVENOUS; SUBCUTANEOUS at 06:31

## 2022-02-25 RX ADMIN — Medication 100 MCG: at 18:21

## 2022-02-25 RX ADMIN — ROCURONIUM BROMIDE 10 MG: 10 INJECTION, SOLUTION INTRAVENOUS at 18:28

## 2022-02-25 RX ADMIN — PROPOFOL 160 MG: 10 INJECTION, EMULSION INTRAVENOUS at 17:21

## 2022-02-25 RX ADMIN — FENTANYL CITRATE 50 MCG: 50 INJECTION, SOLUTION INTRAMUSCULAR; INTRAVENOUS at 17:51

## 2022-02-25 RX ADMIN — ROCURONIUM BROMIDE 40 MG: 10 INJECTION, SOLUTION INTRAVENOUS at 17:34

## 2022-02-25 RX ADMIN — TRANEXAMIC ACID 1000 MG: 1 INJECTION, SOLUTION INTRAVENOUS at 17:12

## 2022-02-25 RX ADMIN — TRANEXAMIC ACID 1000 MG: 1 INJECTION, SOLUTION INTRAVENOUS at 19:01

## 2022-02-25 RX ADMIN — INSULIN LISPRO 2 UNITS: 100 INJECTION, SOLUTION INTRAVENOUS; SUBCUTANEOUS at 22:01

## 2022-02-25 ASSESSMENT — PULMONARY FUNCTION TESTS
PIF_VALUE: 2
PIF_VALUE: 20
PIF_VALUE: 18
PIF_VALUE: 20
PIF_VALUE: 1
PIF_VALUE: 20
PIF_VALUE: 20
PIF_VALUE: 1
PIF_VALUE: 20
PIF_VALUE: 18
PIF_VALUE: 20
PIF_VALUE: 19
PIF_VALUE: 18
PIF_VALUE: 20
PIF_VALUE: 18
PIF_VALUE: 18
PIF_VALUE: 1
PIF_VALUE: 19
PIF_VALUE: 20
PIF_VALUE: 18
PIF_VALUE: 18
PIF_VALUE: 20
PIF_VALUE: 18
PIF_VALUE: 18
PIF_VALUE: 20
PIF_VALUE: 19
PIF_VALUE: 20
PIF_VALUE: 29
PIF_VALUE: 17
PIF_VALUE: 19
PIF_VALUE: 20
PIF_VALUE: 18
PIF_VALUE: 20
PIF_VALUE: 20
PIF_VALUE: 19
PIF_VALUE: 15
PIF_VALUE: 19
PIF_VALUE: 20
PIF_VALUE: 20
PIF_VALUE: 19
PIF_VALUE: 1
PIF_VALUE: 18
PIF_VALUE: 18
PIF_VALUE: 19
PIF_VALUE: 20
PIF_VALUE: 19
PIF_VALUE: 20
PIF_VALUE: 18
PIF_VALUE: 19
PIF_VALUE: 25
PIF_VALUE: 1
PIF_VALUE: 20
PIF_VALUE: 18
PIF_VALUE: 20
PIF_VALUE: 18
PIF_VALUE: 26
PIF_VALUE: 18
PIF_VALUE: 19
PIF_VALUE: 19
PIF_VALUE: 20
PIF_VALUE: 19
PIF_VALUE: 18
PIF_VALUE: 19
PIF_VALUE: 20
PIF_VALUE: 18
PIF_VALUE: 19
PIF_VALUE: 20
PIF_VALUE: 21
PIF_VALUE: 20
PIF_VALUE: 19
PIF_VALUE: 20
PIF_VALUE: 19
PIF_VALUE: 19
PIF_VALUE: 18
PIF_VALUE: 18
PIF_VALUE: 20
PIF_VALUE: 11
PIF_VALUE: 19
PIF_VALUE: 20
PIF_VALUE: 1
PIF_VALUE: 5
PIF_VALUE: 18
PIF_VALUE: 20
PIF_VALUE: 18
PIF_VALUE: 20
PIF_VALUE: 18
PIF_VALUE: 20
PIF_VALUE: 20
PIF_VALUE: 21
PIF_VALUE: 18
PIF_VALUE: 20
PIF_VALUE: 20
PIF_VALUE: 17
PIF_VALUE: 20
PIF_VALUE: 19
PIF_VALUE: 18
PIF_VALUE: 19
PIF_VALUE: 20
PIF_VALUE: 21
PIF_VALUE: 18
PIF_VALUE: 18
PIF_VALUE: 19
PIF_VALUE: 18
PIF_VALUE: 20
PIF_VALUE: 18

## 2022-02-25 ASSESSMENT — PAIN SCALES - GENERAL
PAINLEVEL_OUTOF10: 7
PAINLEVEL_OUTOF10: 6
PAINLEVEL_OUTOF10: 6
PAINLEVEL_OUTOF10: 7
PAINLEVEL_OUTOF10: 6
PAINLEVEL_OUTOF10: 8
PAINLEVEL_OUTOF10: 4
PAINLEVEL_OUTOF10: 6
PAINLEVEL_OUTOF10: 5
PAINLEVEL_OUTOF10: 5
PAINLEVEL_OUTOF10: 8
PAINLEVEL_OUTOF10: 5
PAINLEVEL_OUTOF10: 4
PAINLEVEL_OUTOF10: 6
PAINLEVEL_OUTOF10: 5
PAINLEVEL_OUTOF10: 6

## 2022-02-25 ASSESSMENT — PAIN DESCRIPTION - LOCATION
LOCATION: HIP
LOCATION: HIP

## 2022-02-25 ASSESSMENT — PAIN DESCRIPTION - ORIENTATION
ORIENTATION: LEFT
ORIENTATION: LEFT

## 2022-02-25 ASSESSMENT — PAIN DESCRIPTION - PROGRESSION
CLINICAL_PROGRESSION: NOT CHANGED

## 2022-02-25 ASSESSMENT — PAIN DESCRIPTION - FREQUENCY
FREQUENCY: CONTINUOUS
FREQUENCY: CONTINUOUS

## 2022-02-25 ASSESSMENT — PAIN DESCRIPTION - ONSET
ONSET: ON-GOING
ONSET: ON-GOING

## 2022-02-25 ASSESSMENT — PAIN DESCRIPTION - DESCRIPTORS
DESCRIPTORS: ACHING
DESCRIPTORS: ACHING

## 2022-02-25 ASSESSMENT — PAIN DESCRIPTION - PAIN TYPE
TYPE: ACUTE PAIN
TYPE: ACUTE PAIN

## 2022-02-25 NOTE — PROGRESS NOTES
Physical/Occupational Therapy  Andrea Johns  PT/OT orders received. Per chart review pt is going to the OR today for brandon-arthroplasty s/p fall with resulting displaced fx of (L) femoral neck. Will follow-up post-op.    No charge   Angelique Frazier, PT, DPT #[id number]  G[employer], OTR/L [id number]

## 2022-02-25 NOTE — PROGRESS NOTES
Shift assessment completed. Routine vitals stable. Scheduled medications given. Patient is awake, alert and oriented. Respirations are easy and unlabored. Pt c/o hip pain, PRN pain meds given. Pt cont to be NPO at this time awaiting for surgery this afternoon. Call light within reach.

## 2022-02-25 NOTE — PROGRESS NOTES
Hospitalist Progress Note      PCP: Referring Not In System (Inactive)    Date of Admission: 2/24/2022    LOS: 1    Chief Complaint:   Chief Complaint   Patient presents with    Fall     Pt brought to ED by ems from home after tripping down carpeted stairs inside his home. Pt denies hitting his head, denies loc. Has abrasion to left forearm. Pt c/o left hip pain, external rotation noted. Case Summary:   80 y.o. male with history of hypertension, type 2 diabetes, stage III CKD who had a mechanical fall going down stairs at home slipping on the steps sustaining acute left femoral neck fracture complicated by acute on chronic kidney injury        Active Hospital Problems    Diagnosis Date Noted    Closed displaced fracture of left femoral neck ([address] [S72.002A] 02/24/2022    CKD (chronic kidney disease), stage III ([address] [N18.30] 02/24/2022    Type 2 diabetes mellitus ([address] [E11.9] 02/24/2022    Essential hypertension [I10] 02/24/2022         Principal Problem:    Closed displaced fracture of left femoral neck ([address]): Pain adequately managed with as needed. Plan for ORIF this afternoon. Orthopedic following with recommendation    Active Problems:   Acute on CKD (chronic kidney disease), stage III ([address]): Noted creatinine bump this morning.  -Continue IV hydration  -Nephrology consult  -Monitor renal function electrolytes  -Avoid nephrotoxic agent  -Hold diuretics and ACE inhibitor/ARB      Type 2 diabetes mellitus ([address]): Sliding scale insulin for glycemic control    Essential hypertension: On amlodipine.   We will stop Hyzaar due to acute kidney injury        Medications:  Reviewed  Infusion Medications    sodium chloride       Scheduled Medications    ortho mix injection   Injection On Call    tranexamic acid (CYCLOKAPRON) IVPB  1,000 mg IntraVENous Once    tranexamic acid (CYCLOKAPRON) IVPB  1,000 mg IntraVENous On Call to OR    atorvastatin  20 mg Oral Daily    sodium chloride flush  5-40 mL IntraVENous 2 times per day    enoxaparin  40 mg SubCUTAneous Daily    amLODIPine  5 mg Oral Daily     PRN Meds: sodium chloride flush, sodium chloride, ondansetron **OR** ondansetron, polyethylene glycol, acetaminophen **OR** acetaminophen, HYDROmorphone **OR** HYDROmorphone, HYDROcodone 5 mg - acetaminophen      DVT Prophylaxis: Subcut enoxaparin  Diet: Diet NPO  Code Status: Full Code    Dispo: Anticipate discharge in the next 72 hrs    ____________________________________________________________________________    Subjective:   Overnight Events:   Uneventful overnight  Adequate pain control  Noted for renal dysfunction      Physical Exam:  BP (!) 164/89   Pulse 88   Temp 98.3 °F (36.8 °C) (Oral)   Resp 16   Ht 5' 8\" (1.727 m)   Wt 174 lb (78.9 kg)   SpO2 97%   BMI 26.46 kg/m²   General appearance: No apparent distress, appears stated age and cooperative. HEENT: Normocephalic, atraumatic, MMM, No sclera icterus/conjuctival palor  Neck: Supple, no thyromegally. No jugular venous distention. Respiratory:  Normal respiratory effort. Clear to auscultation, no Rales/Wheezes/Rhonchi. Cardiovascular: S1/S2 without murmurs, rubs or gallops. RRR  Abdomen: Soft, non-tender, non-distended, bowel sounds present. Musculoskeletal: No clubbing, cyanosis or edema bilaterally. Skin: Skin color, texture, turgor normal.  No rashes or lesions.   Neurologic:  Cranial nerves: II-XII intact, LB, No focal sensory/motor deficits  Psychiatric: Alert and oriented, thought content appropriate  Capillary Refill: Brisk,< 3 seconds   Peripheral Pulses: +2 palpable, equal bilaterally       Intake/Output Summary (Last 24 hours) at 2/25/2022 1225  Last data filed at 2/25/2022 1224  Gross per 24 hour   Intake 380 ml   Output 964 ml   Net -584 ml       Labs:   Recent Labs     02/24/22  1010 02/25/22  0445   WBC 14.2* 10.5   HGB 11.4* 12.4*   HCT 33.5* 36.4*    255      Recent Labs     02/24/22  1010 02/25/22  0445    140   K 4.7 4.1    102   CO2 19* 26   BUN 38* 38*   CREATININE 1.9* 2.2*   CALCIUM 8.7 9.3     Recent Labs     02/25/22  0445   CKTOTAL 101       Urinalysis:  No results found for: Davion Bhatt, BACTERIA, RBCUA, BLOODU, SPECGRAV, GLUCOSEU    Radiology:  XR CHEST PORTABLE   Final Result   No radiographic evidence of acute pulmonary disease. XR HIP 2-3 VW W PELVIS LEFT   Final Result   1. Acute left femoral neck fracture. XR FEMUR LEFT (MIN 2 VIEWS)   Final Result   1. Acute left femoral neck fracture. US RETROPERITONEAL COMPLETE    (Results Pending)           Arnold Berry MD      Please excuse brevity and/or typos. This report was transcribed using voice recognition software. Every effort was made to ensure accuracy, however, inadvertent computerized transcription errors may be present.

## 2022-02-25 NOTE — CONSULTS
[address]                     [address] [facility], [address]                                  CONSULTATION    PATIENT NAME: Vandana Viramontes                     :        1950  MED REC NO:   [id number]                          ROOM:       [id number]  ACCOUNT NO:   [de-identified]                           ADMIT DATE: 2022  PROVIDER:     Maritza Vargas MD    RENAL CONSULTATION    CONSULT DATE:  2022    CONSULTING PHYSICIAN:  Maritza Vargas MD    REFERRING PROVIDER:  Dr. Arnold Powell. REASON FOR CONSULTATION:  Acute kidney injury on CKD IIIB, hypertension. HISTORY OF PRESENT ILLNESS:  The patient is a 61-year-old male with  history of CKD IIIA to B, baseline creatinine around 1.8 to 2, BPH,  diabetes mellitus for several years, diabetic retinopathy, hypertension  since he was young, presented to the hospital after a fall going down  the stairs without any loss of consciousness. It resulted on left  femoral hip fracture. I am asked to see the patient because his  creatinine is higher at 2.2 today from 1.9 yesterday. On initial  presentation, serum bicarbonate was also low at 19, but better at 26. His blood pressure is also elevated with systolic ranging in the 802Q to  190s range. He denies any change in his urine output. No gross  hematuria. He is being followed by Urology secondary to BPH and may  need possible prostate surgery. His outpatient medications included  losartan HCTZ and lisinopril. He is also on a statin. He denies any  gross hematuria. Denies any decrease in his urine output. No regular  NSAID use. No vomiting or diarrhea. PAST MEDICAL HISTORY:  CKD IIIB, BPH, diabetes mellitus with  retinopathy, hypertension. ALLERGIES:  None. MEDICATIONS PRIOR TO ADMISSION:  Includes sitagliptin plus metformin,  amlodipine, losartan HCTZ, glimepiride, lisinopril, atorvastatin. SOCIAL HISTORY:  Lives with his family.   No active smoking, alcohol or  drug abuse. FAMILY HISTORY:  Denies any family history of kidney disease. REVIEW OF SYSTEMS:  GENERAL:  Positive malaise. SKIN:  No skin rash, itching or discharge. NEUROLOGIC:  No headaches or focal deficits. CARDIOVASCULAR:  No chest pain or palpitations. PULMONARY:  No shortness of breath. No coughing or hemoptysis. GI:  No abdominal pain. No nausea, no vomiting or diarrhea or  constipation. RENAL:  Denies any gross hematuria or change in urine output. ENDOCRINE:  History of diabetes. No heat or cold intolerance. ID:  No fever or chills. MUSCULOSKELETAL:  Denies any active joint pains. Recent fall. PHYSICAL EXAMINATION:  VITAL SIGNS:  Blood pressure 164/89, pulse 88, respirations 16,  temperature 98.3, saturating 97%. Weight listed at 78.9 kg. Urine  output recorded at 500 mL. GENERAL:  Appears well. HEENT:  Anicteric sclerae. Clear conjunctivae. SKIN:  Anicteric. No rash. CHEST:  Clear. HEART:  Regular. ABDOMEN:  Soft, nontender. No rebound or involuntary guarding. EXTREMITIES:  No edema. LABORATORY DATA:  Sodium 140, potassium 4.1, chloride 102, bicarb 26,  BUN 38, creatinine 2.2, glucose 143, calcium 9.3. . WBC 10.5,  hemoglobin 12.4, hematocrit 36.4, platelet count 154,611. Urinalysis is  pending. Renal ultrasound is pending. DIAGNOSTIC DATA:  Chest x-ray, no acute pulmonary disease. X-ray of the  left femur shows acute left femoral neck fracture. ASSESSMENT AND PLAN:  1. DANIEL on CKD IIIB, baseline creatinine around 1.8 to 2. DANIEL probably  due to decreased renal perfusion in the setting of ACE plus thiazide  plus ARB. Would have to consider obstruction in the setting of BPH. Would check bladder scan and renal ultrasound. Since he is on a statin,  we will check a CK level. Would start normal saline at 80 mL per hour. Okay to proceed with left hip surgery today from renal perspective. 2.  Low serum bicarbonate currently better.   This may just be sample  abnormality. We will continue to follow. 3.  Hypertension uncontrolled. We will increase amlodipine to 10 mg  daily. 4.  History of hyperlipidemia on statin. CK within normal limits. Continue. 5.  Anemia, mild. Follow hemoglobin. 6.  Left hip fracture. Okay to proceed with surgery. 7.  History of BPH. Follow renal ultrasound and bladder scanning. Thank you very much for this consult. We will follow with you.         Luca Kruse MD    D: 02/25/2022 11:56:52       T: 02/25/2022 12:00:00     [email or web address]  Job#: [id number]     Doc#: [id number]    CC:

## 2022-02-25 NOTE — CONSULTS
Renal Consult  Full Note [id number]  A/P  1.  S/P Fall-left femoral hip fracture. Plan for surgery today. 2.  DANIEL on CKD 3-Crea was 1.8 on 9/2021. CKD probably from DM.  +H/O Retinopathy. Follow UA. NSS trial.   Hold ARB and thiazide. 3.  H/O BPH-check bladder scan and renal US. 4.  Mild Anemia-follow Hgb  5. HTN- increase Amlodipine to 10mg  6. HLD-on statin. CK WNL. Thank you.

## 2022-02-25 NOTE — ANESTHESIA PRE PROCEDURE
Department of Anesthesiology  Preprocedure Note       Name:  Mary Jackson   Age:  80 y.o.  :  1950                                          MRN:  [id number]         Date:  2022      Surgeon: Real Toney):  Mani Krishna MD    Procedure: Procedure(s):  LEFT TOTAL HIP ARTHROPLASTY, ANTERIOR APPROACH ([place])    Medications prior to admission:   Prior to Admission medications    Medication Sig Start Date End Date Taking? Authorizing Provider   SITagliptin-metFORMIN HCl ER (JANUMET XR) 100-1000 MG TB24 Take 100-1,000 mg by mouth daily   Yes Historical Provider, MD   amLODIPine (NORVASC) 5 MG tablet Take 5 mg by mouth daily   Yes Historical Provider, MD   losartan-hydroCHLOROthiazide (HYZAAR) 100-12.5 MG per tablet Take 1 tablet by mouth daily   Yes Historical Provider, MD   Continuous Blood Gluc Sensor (FREESTYLE SHAINA 14 DAY SENSOR) MISC by Does not apply route   Yes Historical Provider, MD   atorvastatin (LIPITOR) 20 MG tablet Take 20 mg by mouth daily.      Yes Historical Provider, MD   glimepiride (AMARYL) 2 MG tablet Take 4 mg by mouth every morning (before breakfast)    Yes Historical Provider, MD       Current medications:    Current Facility-Administered Medications   Medication Dose Route Frequency Provider Last Rate Last Admin    ortho mix injection   Injection On Call Nery Styles MD        tranexamic acid (CYKLOKAPRON) 1,000 mg in sodium chloride 0.9 % 50 mL IVPB  1,000 mg IntraVENous Once Nery Styles MD        tranexamic acid (CYKLOKAPRON) 1,000 mg in sodium chloride 0.9 % 50 mL IVPB  1,000 mg IntraVENous On Call to [address] Mono Jensen MD        atorvastatin (LIPITOR) tablet 20 mg  20 mg Oral Daily Vilma Tobin MD   20 mg at 22 0847    sodium chloride flush 0.9 % injection 5-40 mL  5-40 mL IntraVENous 2 times per day Vilma Tobin MD   10 mL at 22 0848    sodium chloride flush 0.9 % injection 5-40 mL  5-40 mL IntraVENous PRN Kyung Barker MD       [facility] 0.9 % sodium chloride infusion  25 mL IntraVENous PRN Vilma Tobin MD        enoxaparin (LOVENOX) injection 40 mg  40 mg SubCUTAneous Daily Vilma Tobin MD   40 mg at 02/25/22 0847    ondansetron (ZOFRAN-ODT) disintegrating tablet 4 mg  4 mg Oral Q8H PRN Vilma Tobin MD        Or    ondansetron (ZOFRAN) injection 4 mg  4 mg IntraVENous Q6H PRN Vilma Tobin MD        polyethylene glycol (GLYCOLAX) packet 17 g  17 g Oral Daily PRN Vilma Tobin MD        acetaminophen (TYLENOL) tablet 650 mg  650 mg Oral Q6H PRN Vilma Tobin MD        Or    acetaminophen (TYLENOL) suppository 650 mg  650 mg Rectal Q6H PRN Vilma Tobin MD        HYDROmorphone HCl PF (DILAUDID) injection 0.25 mg  0.25 mg IntraVENous Q3H PRN Vilma Tobin MD        Or    HYDROmorphone HCl PF (DILAUDID) injection 0.5 mg  0.5 mg IntraVENous Q3H PRN Vilma Tobin MD   0.5 mg at 02/25/22 1353    HYDROcodone-acetaminophen ([facility]) 5-325 MG per tablet 1 tablet  1 tablet Oral Q6H PRN Vilma Tobin MD   1 tablet at 02/25/22 0855    amLODIPine (NORVASC) tablet 5 mg  5 mg Oral Daily Vilma Tobin MD   5 mg at 02/25/22 1261       Allergies:  No Known Allergies    Problem List:    Patient Active Problem List   Diagnosis Code    Closed displaced fracture of left femoral neck ([facility] S72.002A    CKD (chronic kidney disease), stage III ([facility]) N18.30    Type 2 diabetes mellitus ([facility] E11.9    Essential hypertension I10       Past Medical History:        Diagnosis Date    Diabetes mellitus     Hypertension        Past Surgical History:        Procedure Laterality Date    ANKLE FRACTURE SURGERY         Social History:    Social History     Tobacco Use    Smoking status: Never Smoker    Smokeless tobacco: Not on file   Substance Use Topics    Alcohol use:  Yes     Alcohol/week: 1.0 standard drink     Types: 1 Glasses of wine per week                                Counseling given: Not Answered      Vital Signs (Current):   Vitals:    02/25/22 0333 02/25/22 0845 02/25/22 0904 02/25/22 1430   BP: (!) 163/93 (!) 164/89  (!) 162/90   Pulse: 90 88  92   Resp: 16 16  18   Temp: 98.3 °F (36.8 °C) 98.3 °F (36.8 °C)  98.6 °F (37 °C)   TempSrc: Oral Oral  Oral   SpO2: 94% 99% 97% 92%   Weight:       Height:                                                  BP Readings from Last 3 Encounters:   02/25/22 (!) 162/90       NPO Status:                                                                                 BMI:   Wt Readings from Last 3 Encounters:   02/24/22 174 lb (78.9 kg)     Body mass index is 26.46 kg/m². CBC:   Lab Results   Component Value Date    WBC 10.5 02/25/2022    RBC 3.91 02/25/2022    HGB 12.4 02/25/2022    HCT 36.4 02/25/2022    MCV 93.1 02/25/2022    RDW 12.9 02/25/2022     02/25/2022       CMP:   Lab Results   Component Value Date     02/25/2022    K 4.1 02/25/2022     02/25/2022    CO2 26 02/25/2022    BUN 38 02/25/2022    CREATININE 2.2 02/25/2022    GFRAA 36 02/25/2022    LABGLOM 30 02/25/2022    GLUCOSE 143 02/25/2022    CALCIUM 9.3 02/25/2022       POC Tests: No results for input(s): POCGLU, POCNA, POCK, POCCL, POCBUN, POCHEMO, POCHCT in the last 72 hours.     Coags:   Lab Results   Component Value Date    PROTIME 11.0 02/24/2022    INR 0.97 02/24/2022    APTT 33.2 02/24/2022       HCG (If Applicable): No results found for: PREGTESTUR, PREGSERUM, HCG, HCGQUANT     ABGs: No results found for: PHART, PO2ART, ABN6MSR, HKH7YGR, BEART, V8MGZHDH     Type & Screen (If Applicable):  No results found for: LABABO, LABRH    Drug/Infectious Status (If Applicable):  No results found for: HIV, HEPCAB    COVID-19 Screening (If Applicable):   Lab Results   Component Value Date    COVID19 Not Detected 02/24/2022           Anesthesia Evaluation  Patient summary reviewed and Nursing notes reviewed  Airway: Mallampati: II        Dental:          Pulmonary: Cardiovascular:    (+) hypertension:,                   Neuro/Psych:               GI/Hepatic/Renal:   (+) renal disease: ARF,           Endo/Other:    (+) Diabetes, . Abdominal:             Vascular:           Other Findings:             Anesthesia Plan      general     ASA 3                                 Crissy Ingram MD   2/25/2022

## 2022-02-26 LAB
ANION GAP SERPL CALCULATED.3IONS-SCNC: 15 MMOL/L (ref 3–16)
BUN BLDV-MCNC: 49 MG/DL (ref 7–20)
CALCIUM SERPL-MCNC: 8 MG/DL (ref 8.3–10.6)
CHLORIDE BLD-SCNC: 102 MMOL/L (ref 99–110)
CO2: 19 MMOL/L (ref 21–32)
CREAT SERPL-MCNC: 2.8 MG/DL (ref 0.8–1.3)
GFR AFRICAN AMERICAN: 27
GFR NON-AFRICAN AMERICAN: 22
GLUCOSE BLD-MCNC: 201 MG/DL (ref 70–99)
GLUCOSE BLD-MCNC: 242 MG/DL (ref 70–99)
GLUCOSE BLD-MCNC: 370 MG/DL (ref 70–99)
GLUCOSE BLD-MCNC: 370 MG/DL (ref 70–99)
GLUCOSE BLD-MCNC: 404 MG/DL (ref 70–99)
HCT VFR BLD CALC: 25.9 % (ref 40.5–52.5)
HEMOGLOBIN: 8.6 G/DL (ref 13.5–17.5)
MCH RBC QN AUTO: 31 PG (ref 26–34)
MCHC RBC AUTO-ENTMCNC: 33.1 G/DL (ref 31–36)
MCV RBC AUTO: 93.7 FL (ref 80–100)
PDW BLD-RTO: 12.8 % (ref 12.4–15.4)
PERFORMED ON: ABNORMAL
PLATELET # BLD: 194 K/UL (ref 135–450)
PMV BLD AUTO: 8.2 FL (ref 5–10.5)
POTASSIUM REFLEX MAGNESIUM: 5.1 MMOL/L (ref 3.5–5.1)
RBC # BLD: 2.77 M/UL (ref 4.2–5.9)
SODIUM BLD-SCNC: 136 MMOL/L (ref 136–145)
WBC # BLD: 11.8 K/UL (ref 4–11)

## 2022-02-26 PROCEDURE — 97165 OT EVAL LOW COMPLEX 30 MIN: CPT

## 2022-02-26 PROCEDURE — 6370000000 HC RX 637 (ALT 250 FOR IP): Performed by: ORTHOPAEDIC SURGERY

## 2022-02-26 PROCEDURE — 85027 COMPLETE CBC AUTOMATED: CPT

## 2022-02-26 PROCEDURE — 2500000003 HC RX 250 WO HCPCS: Performed by: ORTHOPAEDIC SURGERY

## 2022-02-26 PROCEDURE — 1200000000 HC SEMI PRIVATE

## 2022-02-26 PROCEDURE — 94760 N-INVAS EAR/PLS OXIMETRY 1: CPT

## 2022-02-26 PROCEDURE — 2580000003 HC RX 258: Performed by: ORTHOPAEDIC SURGERY

## 2022-02-26 PROCEDURE — 80048 BASIC METABOLIC PNL TOTAL CA: CPT

## 2022-02-26 PROCEDURE — 97116 GAIT TRAINING THERAPY: CPT

## 2022-02-26 PROCEDURE — 94150 VITAL CAPACITY TEST: CPT

## 2022-02-26 PROCEDURE — 97161 PT EVAL LOW COMPLEX 20 MIN: CPT

## 2022-02-26 PROCEDURE — 97530 THERAPEUTIC ACTIVITIES: CPT

## 2022-02-26 PROCEDURE — 6360000002 HC RX W HCPCS: Performed by: ORTHOPAEDIC SURGERY

## 2022-02-26 RX ORDER — SENNA PLUS 8.6 MG/1
1 TABLET ORAL 2 TIMES DAILY PRN
Qty: 14 TABLET | Refills: 0 | Status: SHIPPED | OUTPATIENT
Start: 2022-02-26 | End: 2022-03-05

## 2022-02-26 RX ORDER — HYDROCODONE BITARTRATE AND ACETAMINOPHEN 5; 325 MG/1; MG/1
1 TABLET ORAL EVERY 6 HOURS PRN
Qty: 20 TABLET | Refills: 0 | Status: SHIPPED | OUTPATIENT
Start: 2022-02-26 | End: 2022-03-03

## 2022-02-26 RX ORDER — ASPIRIN 81 MG/1
81 TABLET ORAL 2 TIMES DAILY
Qty: 28 TABLET | Refills: 0 | Status: SHIPPED | OUTPATIENT
Start: 2022-02-26 | End: 2022-05-13

## 2022-02-26 RX ADMIN — SENNOSIDES AND DOCUSATE SODIUM 1 TABLET: 50; 8.6 TABLET ORAL at 20:41

## 2022-02-26 RX ADMIN — INSULIN LISPRO 4 UNITS: 100 INJECTION, SOLUTION INTRAVENOUS; SUBCUTANEOUS at 16:39

## 2022-02-26 RX ADMIN — LOSARTAN POTASSIUM 100 MG: 100 TABLET, FILM COATED ORAL at 08:47

## 2022-02-26 RX ADMIN — HYDROCHLOROTHIAZIDE 12.5 MG: 25 TABLET ORAL at 08:47

## 2022-02-26 RX ADMIN — GLIMEPIRIDE 4 MG: 2 TABLET ORAL at 06:43

## 2022-02-26 RX ADMIN — INSULIN LISPRO 10 UNITS: 100 INJECTION, SOLUTION INTRAVENOUS; SUBCUTANEOUS at 08:52

## 2022-02-26 RX ADMIN — INSULIN LISPRO 2 UNITS: 100 INJECTION, SOLUTION INTRAVENOUS; SUBCUTANEOUS at 20:42

## 2022-02-26 RX ADMIN — CEFAZOLIN 2000 MG: 10 INJECTION, POWDER, FOR SOLUTION INTRAVENOUS at 08:52

## 2022-02-26 RX ADMIN — INSULIN LISPRO 10 UNITS: 100 INJECTION, SOLUTION INTRAVENOUS; SUBCUTANEOUS at 13:10

## 2022-02-26 RX ADMIN — SODIUM CHLORIDE: 9 INJECTION, SOLUTION INTRAVENOUS at 20:40

## 2022-02-26 RX ADMIN — ASPIRIN 81 MG: 81 TABLET, COATED ORAL at 08:47

## 2022-02-26 RX ADMIN — FAMOTIDINE 20 MG: 10 INJECTION, SOLUTION INTRAVENOUS at 08:48

## 2022-02-26 RX ADMIN — SENNOSIDES AND DOCUSATE SODIUM 1 TABLET: 50; 8.6 TABLET ORAL at 08:48

## 2022-02-26 RX ADMIN — ATORVASTATIN CALCIUM 20 MG: 20 TABLET, FILM COATED ORAL at 08:48

## 2022-02-26 RX ADMIN — DEXTROSE MONOHYDRATE 100 ML/HR: 50 INJECTION, SOLUTION INTRAVENOUS at 00:33

## 2022-02-26 RX ADMIN — POLYETHYLENE GLYCOL 3350 17 G: 17 POWDER, FOR SOLUTION ORAL at 08:47

## 2022-02-26 RX ADMIN — ACETAMINOPHEN 325MG 650 MG: 325 TABLET ORAL at 20:41

## 2022-02-26 RX ADMIN — ACETAMINOPHEN 325MG 650 MG: 325 TABLET ORAL at 08:48

## 2022-02-26 RX ADMIN — AMLODIPINE BESYLATE 5 MG: 5 TABLET ORAL at 08:48

## 2022-02-26 RX ADMIN — ACETAMINOPHEN 325MG 650 MG: 325 TABLET ORAL at 03:30

## 2022-02-26 RX ADMIN — CEFAZOLIN 2000 MG: 10 INJECTION, POWDER, FOR SOLUTION INTRAVENOUS at 01:53

## 2022-02-26 RX ADMIN — ASPIRIN 81 MG: 81 TABLET, COATED ORAL at 20:41

## 2022-02-26 RX ADMIN — ACETAMINOPHEN 325MG 650 MG: 325 TABLET ORAL at 15:04

## 2022-02-26 RX ADMIN — BISACODYL 5 MG: 5 TABLET, COATED ORAL at 08:48

## 2022-02-26 ASSESSMENT — PAIN DESCRIPTION - PROGRESSION
CLINICAL_PROGRESSION: NOT CHANGED

## 2022-02-26 ASSESSMENT — PAIN SCALES - GENERAL
PAINLEVEL_OUTOF10: 0
PAINLEVEL_OUTOF10: 5
PAINLEVEL_OUTOF10: 0
PAINLEVEL_OUTOF10: 2
PAINLEVEL_OUTOF10: 2
PAINLEVEL_OUTOF10: 1
PAINLEVEL_OUTOF10: 0

## 2022-02-26 ASSESSMENT — PAIN DESCRIPTION - ORIENTATION: ORIENTATION: LEFT

## 2022-02-26 ASSESSMENT — PAIN DESCRIPTION - PAIN TYPE: TYPE: ACUTE PAIN;SURGICAL PAIN

## 2022-02-26 ASSESSMENT — PAIN DESCRIPTION - DESCRIPTORS: DESCRIPTORS: ACHING

## 2022-02-26 ASSESSMENT — PAIN - FUNCTIONAL ASSESSMENT: PAIN_FUNCTIONAL_ASSESSMENT: ACTIVITIES ARE NOT PREVENTED

## 2022-02-26 ASSESSMENT — PAIN DESCRIPTION - FREQUENCY: FREQUENCY: INTERMITTENT

## 2022-02-26 ASSESSMENT — PAIN DESCRIPTION - ONSET: ONSET: ON-GOING

## 2022-02-26 ASSESSMENT — PAIN DESCRIPTION - LOCATION: LOCATION: HIP

## 2022-02-26 NOTE — OP NOTE
Operative Note      Patient: Andrea Johns  YOB: 1950  MRN: [id number]    Date of Procedure: 2022    Pre-Op Diagnosis: Left Femoral Neck Fracture    Post-Op Diagnosis: Same       Procedure(s):  LEFT TOTAL HIP ARTHROPLASTY, ANTERIOR APPROACH (Kayleen Blount)    Surgeon(s):  Kathia Leon MD    Assistant:   Surgical Assistant: Ari Angela RN    Anesthesia: General    Estimated Blood Loss (mL): 718    Complications: None    Specimens:   * No specimens in log *    Implants:  Implant Name Type Inv. Item Serial No.  Lot No. LRB No. Used Action   G7 VIT E NEUTRAL LNR 36MM E - [id number] VIT E NEUTRAL LNR 36MM E  [employer]- [id number] Left 1 Implanted   BONE SCREW 6.5X30 SELF-TAP - [id number]  BONE SCREW 6.5X30 SELF-TAP  [employer]- [id number] Left 1 Implanted   SHELL ACET SZ E [id number] 3 H OSSEOTI LIMIT H 2 MOBILITY G7 - [id number]  SHELL ACET SZ E [id number] 3 H OSSEOTI LIMIT H 2 MOBILITY G7  [employer]- [id number] Left 1 Implanted   STEM FEM STD OFFSET 5 HIP HA AVENIR COMPLETE - [id number]  STEM FEM STD OFFSET 5 HIP HA AVENIR COMPLETE  [employer]- [id number] Left 1 Implanted   HEAD FEM [id number] NK L+7MM HIP BIOLOX DELT YUSUF RONNY PRESSFIT - [id number]  HEAD FEM [id number] NK L+7MM HIP BIOLOX DELT YUSUF RONNY PRESSFIT  [employer]- [id number] Left 1 Implanted         Drains: * No LDAs found *    Detailed Description of Procedure:     Findings: Femoral neck fracture    Operative Report: Indications: The patient is a 80 y.o. male with acute left hip femoral neck fracture from a fall down the steps at home 48 hours ago. After reviewing the risks, benefits and alternatives, he has elected to undergo a total hip arthroplasty. I have reviewed the benefits and draw backs of an anterior approach and he is prepared to proceed, consent was obtained and all questions were answered to his satisfaction.     Description:   The patient was identified in the preoperative holding area and the correct site of the left hip was marked. He was then brought to the operating room and kept on her hospital bed in the supine position and general anesthesia was administered. Well-padded ski boots were placed on both feet to secure them into the [place] table. He was then transferred to the operative table and placed against a well-padded perineal post with both legs secured into the lower limbs spars. Surgical time out was called verifying necessary data including the administration of preoperative antibiotics. The left hip area was then prepped and draped in standard sterile fashion. Bony landmarks were palpated and an incision was made with a #10 blade beginning 2 cm lateral to the anterior superior iliac spine and extending in a diagonal course over the tensor fascia cristóbal muscle for distance of about 10 cm. The subcutaneous tissue was dissected with electrocautery and retractors were positioned. The fascia of the tensor fascia cristóbal muscle was indentified and divided away from its border with the sartorius to help protect the lateral femoral cutaneous nerve. Blunt dissection was then carried out beneath the fascia to expose the interval between the tensor fascia cristóbal and the sartorius. Retractors were positioned to allow visualization of the deeper level. Electrocautery was used to coagulate the circumflex vessels and the reflected head of the rectus femoris was retracted to expose the hip capsule. The capsule was opened with electrocautery and the anterior portion excised. Retractors were then positioned along the femoral neck. The hip was brought into external rotation and debridement with electrocautery for removal of the anterior and superior portion of the acetabular labrum was performed. The lesser trochanter was identified in the base of the wound and the neck resection was then planned.   The hip was brought back to neutral rotation and the sagittal saw was used to create a neck resection. The neck cut was completed posteriorly with an osteotome. The femoral head was then removed with a corkscrew device. Inspection of the femoral head showed a fractured neck. It was then measured on the back table as a size 48.5 mm. Retractors were then positioned to expose the acetabulum with care to ensure positioning the anterior retractor carefully over the anterior portion of the acetabulum protecting both the femoral vessels and the nerve. The acetabulum was cleared of soft tissue with a rongeur and bleeding sealed with Aquamantis cautery. The remaining labrum was debrided with electrocautery. Acetabular reaming then began with a size 49 mm reamer. The acetabular reaming was continued up in 2 mm increments until removal of the cartilage and sclerotic bone was complete and a good area of bleeding bone was encountered. Fluoroscopy was used intermittently to verify the trajectory of the reaming as well as its depth relative to the teardrop. The area was thoroughly irrigated and the acetabular cup (52 mm G7 cup) was then impacted into position under fluoroscopic guidance. It showed excellent fit with opposition to the pelvis and no evidence of micro-motion. The impactor was removed and the acetabular screw was placed in the safe zone. The polyethylene liner was then inserted and snapped into position and shown to be flush with the components as per technique. Attention was then turned to the femoral side, the support hook was placed laterally over the vastus lateralis and the hip was brought into full external rotation at about 110 degrees. The hip was then extended and abducted. The support hook was secured to the table and the proximal portion of the femur was carefully raised under direct visualization to improve exposure. Retractors were positioned along the femoral calcar and posteriorly to protect the tensor fascia muscle.   Carefully some the posterior capsule was released to improve exposure. The canal was then opened proximally and using a hand rasp its direction verified. The starting broach was then used with care taken to insure appropriate version relative to the posterior femoral cortex. The femur was then broached up as per technique and the final broach showed excellent seating. Its relationship to the calcar was flush. A trial standard offset size 5 stem was placed as well as a trial 36 mm + 7.0 mm femoral ball. The femoral support hook was lowered and the hip was brought back into the reduced position. Fluoroscopic views were obtained of the AP pelvis and referencing the lesser trochanter on the contralateral hip and estimation of offset and limb length were made. They were judged to be the best approximation to provide stability for the sizes. The hip was then dislocated again and placed back into the broaching position. The trials were removed and the area was thoroughly irrigated. The final femoral implant was then impacted into position and shown to be resting at the level of the calcar similar to the trials. The final femoral head was also impacted into position and shown to be secured. The femoral support hook was removed and the hip was brought into reduction again. Fluoroscopic views showed similar limb length and offset relative to the trial reduction. The joint shuck on the operative table was satisfactory. The hip was brought through range of motion after removing the limb from the spar and brought to flexion and shown to be stable. External  rotation with the hip extended was satisfactory and there was no evidence of component impingement. The area was thoroughly irrigated again. The aquamantis was used to verify adequacy of hemostasis by treating the capsular structures.   An injection of orthopedic analgesic mixture was carefully infiltrated with careful aspiration around the capsular area to improve postoperative analgesia. A solution of dilute chlorhexidine was placed in the wound and allowed to sit for 2-3 minutes to aid in bacterial control. It was then removed with suction and cleansed again with pulsatile lavage. The capsule was closed with the pre-passed #2 ethibond sutures, and further re-enforced with a figure of 8 suture, using #1 vicryl. The tensor fascia cristóbal fascia was then closed with running #2 Strata Fix suture. Skin was closed with 2-0 Vicryl and 3-0 Monocryl. Dermabond and Prineo dressing was applied and allowed to dry, then a therabond dressing sealed the wound. The patient was then removed from the operative table, awakened and taken to recovery room in stable condition having tolerated the procedure well. The patient's foot was noted to be warm and pink color removal of the traction boot. No significant skin lesions were noted either on the feet. All needle and sponge counts matched the initial count per circulating and scrub personnel x2 prior to terminating this case. Sincerely,        Sincerely,    Radha Berg MD [address] [place] [address]  Email: [email or web address]  Office: [phone]      02/25/22  7:32 PM          Electronically signed by Radha Berg MD on 2/25/2022 at 7:32 PM

## 2022-02-26 NOTE — PROGRESS NOTES
Orthopaedic Surgery Post Operative [facility] Rounding Progress Note    History of Present Illness:  Esme Love is a 80 y.o. male who was seen this morning. There were no events overnight. Pain is controlled. They currently deny any dizziness, fevers, chills, shortness of breath, chest pain, paresthesias, or any other current complaints. Medical History:  Patient's medications, allergies, past medical, surgical, social and family histories were reviewed and updated as appropriate. They are as noted. Social History     Socioeconomic History    Marital status:      Spouse name: Not on file    Number of children: Not on file    Years of education: Not on file    Highest education level: Not on file   Occupational History    Not on file   Tobacco Use    Smoking status: Never Smoker    Smokeless tobacco: Not on file   Substance and Sexual Activity    Alcohol use: Yes     Alcohol/week: 1.0 standard drink     Types: 1 Glasses of wine per week    Drug use: Not on file    Sexual activity: Not on file   Other Topics Concern    Not on file   Social History Narrative    Not on file     Social Determinants of Health     Financial Resource Strain:     Difficulty of Paying Living Expenses: Not on file   Food Insecurity:     Worried About Running Out of Food in the Last Year: Not on file    Angie of Food in the Last Year: Not on file   Transportation Needs:     Lack of Transportation (Medical): Not on file    Lack of Transportation (Non-Medical):  Not on file   Physical Activity:     Days of Exercise per Week: Not on file    Minutes of Exercise per Session: Not on file   Stress:     Feeling of Stress : Not on file   Social Connections:     Frequency of Communication with Friends and Family: Not on file    Frequency of Social Gatherings with Friends and Family: Not on file    Attends [employer] Services: Not on file    Active Member of Clubs or Organizations: Not on file    Attends Club or Organization Meetings: Not on file    Marital Status: Not on file   Intimate Partner Violence:     Fear of Current or Ex-Partner: Not on file    Emotionally Abused: Not on file    Physically Abused: Not on file    Sexually Abused: Not on file   Housing Stability:     Unable to Pay for Housing in the Last Year: Not on file    Number of [place] in the Last Year: Not on file    Unstable Housing in the Last Year: Not on file       No Known Allergies  No current facility-administered medications on file prior to encounter. Current Outpatient Medications on File Prior to Encounter   Medication Sig Dispense Refill    SITagliptin-metFORMIN HCl ER (JANUMET XR) 100-1000 MG TB24 Take 100-1,000 mg by mouth daily      amLODIPine (NORVASC) 5 MG tablet Take 5 mg by mouth daily      losartan-hydroCHLOROthiazide (HYZAAR) 100-12.5 MG per tablet Take 1 tablet by mouth daily      Continuous Blood Gluc Sensor (TwitChatSTYLE SHAINA 14 DAY SENSOR) MISC by Does not apply route      atorvastatin (LIPITOR) 20 MG tablet Take 20 mg by mouth daily.  glimepiride (AMARYL) 2 MG tablet Take 4 mg by mouth every morning (before breakfast)        family history is not on file.   Past Medical History:   Diagnosis Date    Diabetes mellitus     Hypertension      Past Medical History:   Diagnosis Date    Diabetes mellitus     Hypertension      Active Ambulatory Problems     Diagnosis Date Noted    No Active Ambulatory Problems     Resolved Ambulatory Problems     Diagnosis Date Noted    No Resolved Ambulatory Problems     Past Medical History:   Diagnosis Date    Diabetes mellitus     Hypertension        Review of Systems:  Gen: No dizziness, fevers, chills  HEENT: Negative for double vision, visual changes  CV: Negative for chest pain, palpitations  Lungs: Negative for shortness of breath or wheezing  Abdomen: Negative for abdominal pain or bloating   Neurological: Negative for numbness, paresthesias, or weakness  Psychiatric: Displaying appropriate mentation, judgement, and decision making  Urological: Negative for urinary retention  Skin: warm, well perfused, dressing in place over hip    Vital Signs:  Vitals:    02/26/22 1125   BP:    Pulse:    Resp: 16   Temp:    SpO2: 96%     Height: 5' 8\" (172.7 cm), Weight: 174 lb (78.9 kg), Body mass index is 26.46 kg/m². ,      Physical Exam:     Appearance: Tia Escalante is alert, oriented x 3, resting comforably, no acute distress. LEFT Hip Exam:  Dressing is clean, dry, and intact. Compartments are soft and compressible. Flexion and extension is intact although limited due to post operative status. Motor intact with knee flexion, knee extension, dorsiflexion, EHL, and plantar flexion. Sensation intact on the dorsal and plantar aspect of the foot. Negative Urszula's sign. Compression stockings in place. +DP/PT pulse, foot warm with brisk capillary refill < 2 seconds. Assessment:  1). S/p LEFT Total hip arthroplasty for femoral neck fracture , surgery date 2/25/2022    Treatment Plan:    1). Maintain dressing until office visit  2). Pain control PRN  3). DVT ppx ASA 81 mg PO BID x 28 days  4). Ice/elevate PRN  5). Encourage diet and fluids  6). GI ppx  7). PT/OT, increase activity as tolerated per protocol, WBAT-hip precautions based on approach   8). Nutritional support  9). Hospitalist recommendations regarding Kidney function    Disposition: Anticipate discharge home care pending patient progression and hospitalist clearance  -Call with any questions or concerns: [phone]      Sincerely,    Janice León MD [address] and [address] [facility] [address] [place] [address], [address]  Email: [email or web address]  Office: [phone]    02/26/22  11:45 AM

## 2022-02-26 NOTE — ANESTHESIA POSTPROCEDURE EVALUATION
Department of Anesthesiology  Postprocedure Note    Patient: Ellyn Hardin  MRN: [id number]  YOB: 1950  Date of evaluation: 2/25/2022  Time:  7:24 PM     Procedure Summary     Date: 02/25/22 Room / Location: [address]    Anesthesia Start: [address] Anesthesia Stop:     Procedure: LEFT TOTAL HIP ARTHROPLASTY, ANTERIOR APPROACH (Evertt Bread) (Left Hip) Diagnosis: (Left Femoral Neck Fracture)    Surgeons: Kerrie Tran MD Responsible Provider: Ramesh Alvarez MD    Anesthesia Type: general ASA Status: 3          Anesthesia Type: No value filed. Jason Phase I: Jason Score: 10    Jason Phase II:      Last vitals: Reviewed and per EMR flowsheets.        Anesthesia Post Evaluation    Patient location during evaluation: PACU  Patient participation: complete - patient participated  Level of consciousness: awake  Airway patency: patent  Nausea & Vomiting: no vomiting and no nausea  Complications: no  Cardiovascular status: hemodynamically stable  Respiratory status: acceptable  Hydration status: stable  Multimodal analgesia pain management approach

## 2022-02-26 NOTE — PROGRESS NOTES
Physical Therapy    Facility/Department: [address] ORTHO/NEURO NURSING  Initial Assessment    NAME: Indra Medina  : 1950  MRN: [id number]    Date of Service: 2022    Discharge Recommendations:    Indra Medina scored a 20/24 on the AM-PAC short mobility form. Current research shows that an AM-PAC score of 18 or greater is typically associated with a discharge to the patient's home setting. Based on the patient's AM-PAC score and their current functional mobility deficits, it is recommended that the patient have 2-3 sessions per week of Physical Therapy at d/c to increase the patient's independence. At this time, this patient demonstrates the endurance and safety to discharge home with OP services and a follow up treatment frequency of 2-3x/wk. Please see assessment section for further patient specific details. If patient discharges prior to next session this note will serve as a discharge summary. Please see below for the latest assessment towards goals. PT Equipment Recommendations  Equipment Needed: Yes  Mobility Devices: [place]: Rolling    Assessment   Body structures, Functions, Activity limitations: Decreased functional mobility ; Decreased safe awareness;Decreased strength  Assessment: Pt is s/p L THR after L femoral neck fx from fall down stairs. Pt presents a fairly symmetrical gait pattern with use of RW but with decreased balance and decreased safety awareness. Pt educated about precautions but was not too attentive to instruction and may need reinforcement. Pt has assistance intermittently at home as needed. Pt is currently functioning below baseline and would benefit from continued skilled PT to improve balance and mobility to maximize safe functional independence.   Treatment Diagnosis: impaired mobility, decreased balance  Prognosis: Good  Decision Making: Low Complexity  PT Education: Precautions;Goals;PT Role;Plan of Care;Transfer Training;Functional Mobility Training;General Safety; Adaptive Device Training;Gait Training  Patient Education: anterior hip preauations and how to complete bed mobility while abiding by precautions. Pt verbalized understanding but will need reinforcement  REQUIRES PT FOLLOW UP: Yes  Activity Tolerance  Activity Tolerance: Patient Tolerated treatment well       Patient Diagnosis(es): The primary encounter diagnosis was S/P hip replacement, left. Diagnoses of Closed displaced fracture of left femoral neck ([address] and Fall down stairs, initial encounter were also pertinent to this visit. has a past medical history of Diabetes mellitus and Hypertension. has a past surgical history that includes Ankle fracture surgery. Restrictions  Restrictions/Precautions  Restrictions/Precautions: Weight Bearing  Lower Extremity Weight Bearing Restrictions  Left Lower Extremity Weight Bearing: Weight Bearing As Tolerated  Position Activity Restriction  Hip Precautions:  (no straight leg raise - anterior approach)  Other position/activity restrictions: s/p fall down stairs - L femoral neck fx, s/p THR  Vision/Hearing  Vision: Impaired  Vision Exceptions: Wears glasses for reading  Hearing: Within functional limits     Subjective  General  Chart Reviewed: Yes  Additional Pertinent Hx: 80 y.o. male with history of hypertension, type 2 diabetes, stage III CKD who had a mechanical fall going down stairs at home slipping on the steps sustaining acute left femoral neck fracture complicated by acute on chronic kidney injury  Family / Caregiver Present: No  Diagnosis: L femoral neck fx s/p L THR  Follows Commands: Within Functional Limits  General Comment  Comments: Pt supine on arrival  Subjective  Subjective: anxious to begin the process of discharge. Reports no pain.  Agreeable to PT  Pain Screening  Patient Currently in Pain: Denies          Orientation  Orientation  Overall Orientation Status: Within Normal Limits  Social/Functional History  Social/Functional History  Lives With: Family (wife is in and out; adult daughter (22 yo))  Type of Home: House  Home Layout: Two level,Bed/Bath upstairs,1/2 bath on main level  Home Access: Stairs to enter without rails  Entrance Stairs - Number of Steps: 7-8 platform steps with landing in between each  Bathroom Shower/Tub: Tub/Shower unit  [employer]: Standard  Home Equipment: Crutches  ADL Assistance: Independent  Homemaking Assistance: Independent  Ambulation Assistance: Independent  Transfer Assistance: Independent  Active : Yes  Occupation: Full time employment  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  Hip strength functional for transfefers and gait     Sensation  Overall Sensation Status: WFL     Bed mobility  Supine to Sit: Supervision  Sit to Supine: Minimal assistance (pt attempting to get into bed using SLR - PT stopped pt and provided assist, educating pt on better technique for sit to supine without breaking precautions)  Scooting: Modified independent     Transfers  Sit to Stand: Stand by assistance  Stand to sit: Stand by assistance  Ambulation  Ambulation?: Yes  Ambulation 1  Device: Rolling Walker  Assistance: Contact guard assistance  Quality of Gait: fast ollie that was midly out of control at times (cues to slow down), 2 LOB during which RW left floor on L side but able to recover with CGA, good heel strike and step length  Distance: 250 ft  Stairs/Curb  Stairs?: Yes  Stairs  # Steps : 4  Stairs Height: 6\"  Rails: Bilateral  Device: No Device  Assistance: Contact guard assistance  Comment: step to pattern ascending with RLE, descending with LLE Balance  Sitting - Static: Good  Sitting - Dynamic: Good  Standing - Static: -;Good  Standing - Dynamic: [address]  Times per week: 7  Times per day: Twice a day  Current Treatment Recommendations: Strengthening,Transfer Training,Functional Mobility Training,Stair training,Gait Training,Endurance Training,Balance Training,Safety Education & Training,Equipment Evaluation, Education, & procurement  Safety Devices  Type of devices: Call light within reach,Left in chair,Gait belt,All fall risk precautions in place,Chair alarm in place,Nurse notified    AM-PAC Score  AM-PAC Inpatient Mobility Raw Score : 20 (02/26/22 1433)  AM-PAC Inpatient T-Scale Score : 47.67 (02/26/22 1433)  Mobility Inpatient CMS 0-100% Score: 35.83 (02/26/22 1433)  Mobility Inpatient CMS G-Code Modifier : Jazzmine Whitlock (02/26/22 1433)       Goals  Short term goals  Time Frame for Short term goals: discharge  Short term goal 1: pt will complete bed mobility mod I while abiding by precautions  Short term goal 2: pt will complete sit to stand mod I  Short term goal 3: pt will ambulate 300 ft with RW mod I  Short term goal 4: pt will complete 12 stairs with railing mod I  Short term goal 5: pt will complete curb step with RW mod I  Patient Goals   Patient goals : to discharge home     Therapy Time   Individual Concurrent Group Co-treatment   Time In 0916         Time Out 0950         Minutes 34         Timed Code Treatment Minutes: [address] [place], [place] [id number]

## 2022-02-26 NOTE — PROGRESS NOTES
Hospitalist Progress Note      PCP: Referring Not In System (Inactive)    Date of Admission: 2/24/2022    LOS: 2    Chief Complaint:   Chief Complaint   Patient presents with    Fall     Pt brought to ED by ems from home after tripping down carpeted stairs inside his home. Pt denies hitting his head, denies loc. Has abrasion to left forearm. Pt c/o left hip pain, external rotation noted. Case Summary:   80 y.o. male with history of hypertension, type 2 diabetes, stage III CKD who had a mechanical fall going down stairs at home slipping on the steps sustaining acute left femoral neck fracture complicated by acute on chronic kidney injury        Active Hospital Problems    Diagnosis Date Noted    Closed displaced fracture of left femoral neck ([address] [S72.002A] 02/24/2022    CKD (chronic kidney disease), stage III ([address] [N18.30] 02/24/2022    Type 2 diabetes mellitus ([address] [E11.9] 02/24/2022    Essential hypertension [I10] 02/24/2022         Principal Problem:    Closed displaced fracture of left femoral neck ([address]): status post hip surgery with improved pain management.  -Continue pain management  -Continue PT OT with weightbearing. Orthopedics    Active Problems:   Acute on CKD (chronic kidney disease), stage III ([address]): Creatinine continues to climb despite IV hydration. Noted BP significantly lower at some point last night compared to previously. I wonder if this may be providing relative hypotension  -Continue IV hydration  -Nephrology consult  -Monitor renal function electrolytes  -Avoid nephrotoxic agent  -Hold diuretics and ACE inhibitor/ARB      Type 2 diabetes mellitus ([address]): Sliding scale insulin for glycemic control    Essential hypertension: On amlodipine.   Hyzaar stopped due to acute kidney injury        Medications:  Reviewed  Infusion Medications    sodium chloride 125 mL/hr at 02/25/22 1959    sodium chloride      dextrose 100 mL/hr (02/26/22 0033)     Scheduled Medications    amLODIPine  5 mg Oral Daily    glimepiride  4 mg Oral QAM AC    sodium chloride flush  10 mL IntraVENous 2 times per day    acetaminophen  650 mg Oral Q6H    polyethylene glycol  17 g Oral Daily    bisacodyl  5 mg Oral Daily    sennosides-docusate sodium  1 tablet Oral BID    famotidine  20 mg Oral Daily    Or    famotidine (PEPCID) injection  20 mg IntraVENous Daily    aspirin  81 mg Oral BID    insulin lispro  0-12 Units SubCUTAneous TID WC    insulin lispro  0-6 Units SubCUTAneous Nightly    atorvastatin  20 mg Oral Daily     PRN Meds: sodium chloride flush, sodium chloride, morphine **OR** morphine, oxyCODONE **OR** oxyCODONE, hydrOXYzine, ondansetron **OR** ondansetron, glucose, glucagon (rDNA), dextrose, dextrose bolus (hypoglycemia) **OR** dextrose bolus (hypoglycemia), benzocaine-menthol      DVT Prophylaxis: Subcut enoxaparin  Diet: ADULT DIET; Regular  Code Status: Prior    Dispo: Anticipate discharge in the next 48-72 hrs    ____________________________________________________________________________    Subjective:   Overnight Events:   Uneventful overnight  Adequate pain control  Noted for renal dysfunction      Physical Exam:  BP (!) 145/63   Pulse 92   Temp 98 °F (36.7 °C) (Oral)   Resp 16   Ht 5' 8\" (1.727 m)   Wt 174 lb (78.9 kg)   SpO2 96%   BMI 26.46 kg/m²   General appearance: No apparent distress, appears stated age and cooperative. HEENT: Normocephalic, atraumatic, MMM, No sclera icterus/conjuctival palor  Neck: Supple, no thyromegally. No jugular venous distention. Respiratory:  Normal respiratory effort. Clear to auscultation, no Rales/Wheezes/Rhonchi. Cardiovascular: S1/S2 without murmurs, rubs or gallops. RRR  Abdomen: Soft, non-tender, non-distended, bowel sounds present. Musculoskeletal: No clubbing, cyanosis or edema bilaterally. Skin: Skin color, texture, turgor normal.  No rashes or lesions.   Neurologic:  Cranial nerves: II-XII intact, LB, No focal

## 2022-02-26 NOTE — PROGRESS NOTES
Occupational Therapy   Occupational Therapy Initial Assessment  Date: 2022   Patient Name: Jose A Gastelum  MRN: [id number]     : 1950    Date of Service: 2022    Discharge Recommendations:    Jose A Gastelum scored a 21/24 on the AM-PAC ADL Inpatient form. Current research shows that an AM-PAC score of 18 or greater is typically associated with a discharge to the patient's home setting. Will not need HH or OP OT based on current level of function. Would benefit from continued skilled OT in hospital setting to address performance deficits prior to D/C. If patient discharges prior to next session this note will serve as a discharge summary. Please see below for the latest assessment towards goals. OT Equipment Recommendations  Other: may benefit from shower chair    Assessment   Performance deficits / Impairments: Decreased functional mobility ; Decreased safe awareness;Decreased balance;Decreased ADL status  Assessment: Pt is not at baseline level of function d/t above deficits, impacting daily occupational performance. Pt will not likely need OP OT but would benefit from continued skilled OT in this setting prior to returning home to address safety awareness and hip precautions w/ ADLs and functional activities. Treatment Diagnosis: Decreased ADL status and functional transfers/mobility  Prognosis: Good  Decision Making: Low Complexity  OT Education: OT Role;Plan of Care;Precautions; ADL Adaptive Strategies;Transfer Training  Patient Education: Would benefit from repetition and reinforcement for carryover  Barriers to Learning: Decreased safety awareness  REQUIRES OT FOLLOW UP: Yes  Activity Tolerance  Activity Tolerance: Patient Tolerated treatment well  Safety Devices  Safety Devices in place: Yes  Type of devices: All fall risk precautions in place; Bed alarm in place;Gait belt;Left in bed;Call light within reach           Patient Diagnosis(es): The primary encounter diagnosis was S/P hip replacement, left. Diagnoses of Closed displaced fracture of left femoral neck ([address] and Fall down stairs, initial encounter were also pertinent to this visit. has a past medical history of Diabetes mellitus and Hypertension. has a past surgical history that includes Ankle fracture surgery. Treatment Diagnosis: Decreased ADL status and functional transfers/mobility      Restrictions  Restrictions/Precautions  Restrictions/Precautions: Weight Bearing  Lower Extremity Weight Bearing Restrictions  Left Lower Extremity Weight Bearing: Weight Bearing As Tolerated  Position Activity Restriction  Hip Precautions:  (no straight leg raise - anterior approach)  Other position/activity restrictions: s/p fall down stairs - L femoral neck fx, s/p THR    Subjective   General  Chart Reviewed: Yes  Family / Caregiver Present: No  Diagnosis: L hip fx  Subjective  Subjective: Pt supine in bed upon arrival, agreeable to eval. Reports feeling great and having no pain from surgery yesterday.   Patient Currently in Pain: Denies  Pain Assessment  Clinical Progression: Not changed  Vital Signs  Patient Currently in Pain: Denies  Social/Functional History  Social/Functional History  Lives With: Family (wife is in and out; adult daughter (20 yo))  Type of Home: House  Home Layout: Two level,Bed/Bath upstairs,1/2 bath on main level  Home Access: Stairs to enter without rails  Entrance Stairs - Number of Steps: 7-8 platform steps with landing in between each  Bathroom Shower/Tub: Tub/Shower unit  [employer]: Standard  Home Equipment: Crutches  ADL Assistance: Independent  Homemaking Assistance: Independent  Ambulation Assistance: Independent  Transfer Assistance: Independent  Active : Yes  Occupation: Full time employment  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  Additional Comments: only fall is the one that brought pt to hospital with hip fracture       Objective   Vision: Impaired  Vision Exceptions: Wears glasses for reading  Hearing: Within functional limits    Orientation  Overall Orientation Status: Within Normal Limits     Balance  Sitting Balance: Independent  Standing Balance: Contact guard assistance  Standing Balance  Time: 4 min  Activity: Grooming  Comment: Supervision for safe balance  Functional Mobility  Functional - Mobility Device: Rolling Walker  Activity: Other  Assist Level: Contact guard assistance  Functional Mobility Comments: ambulated ~250' w/ RW - occasional unsteadiness and quick gait, requiring CGA for safety; otherwise pt supervision  Toilet Transfers  Toilet - Technique: Ambulating  Equipment Used: Standard toilet  Toilet Transfer: Supervision  ADL  Grooming: Independent  UE Dressing: Independent  LE Dressing: Supervision  Toileting: Supervision  Tone RUE  RUE Tone: Normotonic  Tone LUE  LUE Tone: Normotonic  Coordination  Movements Are Fluid And Coordinated: Yes     Bed mobility  Supine to Sit: Supervision  Sit to Supine: Minimal assistance  Scooting: Modified independent  Transfers  Sit to stand: Supervision  Stand to sit: Supervision  Vision - Basic Assessment  Prior Vision: Wears glasses only for reading  Patient Visual Report:  Other (add comment) (reports needing a shot in eye monthly d/t decreasing vision)  Cognition  Overall Cognitive Status: WNL  Perception  Overall Perceptual Status: WFL     Sensation  Overall Sensation Status: WFL        LUE AROM (degrees)  LUE AROM : WNL  RUE AROM (degrees)  RUE AROM : WNL  LUE Strength  Gross LUE Strength: WFL  RUE Strength  Gross RUE Strength: WFL         Plan   Plan  Times per week: 5-7 x/week  Times per day: Daily  Current Treatment Recommendations: Balance Training,Functional Mobility Training,Safety Education & Training,Self-Care / ADL,Home Management Training    AM-PAC Score        AM-[facility] Inpatient Daily Activity Raw Score: 21 (02/26/22 1433)  AM-PAC Inpatient ADL T-Scale Score : 44.27 (02/26/22 1433)  ADL Inpatient CMS 0-100% Score: 32.79 (02/26/22 1433)  ADL Inpatient CMS G-Code Modifier : CJ (02/26/22 1433)    Goals  Short term goals  Time Frame for Short term goals: Discharge  Short term goal 1: Pt will be mod I w/ bathing  Short term goal 2: Pt will be mod I w/ dressing  Short term goal 3: Pt will be mod I w/ toileting  Short term goal 4: Pt will be mod I w/ functional mobility/transfers  Long term goals  Time Frame for Long term goals : LTG=STG       Therapy Time   Individual Concurrent Group Co-treatment   Time In       0916   Time Out       0950   Minutes       34         Timed Code Treatment Minutes:  19 Minutes    Total Treatment Minutes:  [facility], [address] [place], [place], [address] [place] [address]

## 2022-02-26 NOTE — PLAN OF CARE
Problem: Falls - Risk of:  Goal: Will remain free from falls  Description: Will remain free from falls  2/26/2022 0814 by Aleshia Ch RN  Outcome: Ongoing     Problem: Falls - Risk of:  Goal: Absence of physical injury  Description: Absence of physical injury  2/26/2022 0814 by Aleshia Ch RN  Outcome: Ongoing     Problem: Pain:  Goal: Pain level will decrease  Description: Pain level will decrease  2/26/2022 0814 by Aleshia Ch RN  Outcome: Ongoing     Problem: Pain:  Goal: Control of acute pain  Description: Control of acute pain  2/26/2022 0814 by Aleshia Ch RN  Outcome: Ongoing     Problem: Pain:  Goal: Control of chronic pain  Description: Control of chronic pain  2/26/2022 0814 by Aleshia Ch RN  Outcome: Ongoing     Problem: Skin Integrity:  Goal: Will show no infection signs and symptoms  Description: Will show no infection signs and symptoms  2/26/2022 0814 by Aleshia Ch RN  Outcome: Ongoing     Problem: Skin Integrity:  Goal: Absence of new skin breakdown  Description: Absence of new skin breakdown  2/26/2022 0814 by Aleshai Ch RN  Outcome: Ongoing

## 2022-02-26 NOTE — PROGRESS NOTES
MD Ange Yeung MD Carlton Saner, MD               Office: [phone]                      Fax: [phone]          NEPHROLOGY INPATIENT PROGRESS NOTE:     PATIENT NAME: Lakesha Cage  : 1950  MRN: [id number]      IMPRESSION / RECOMMENDATIONS:    Admitted with:  Fall down stairs, initial encounter [W10.8XXA]  Closed left hip fracture, initial encounter ([facility] [S72.002A]  Closed displaced fracture of left femoral neck ([facility] [S72.002A]      1. S/P Fall- left femoral neck fracture needing left total hip arthroplasty on 2020    2. DANIEL on CKD 3-Crea was 1.8 on 2021. CKD probably from DM.  +H/O Retinopathy. DANIEL likely due to ATN, postop hemodynamic fluctuation and worsening anemia,    Post void residual very very minimal,  Urinalysis reviewed, showing trace blood, 100 protein, 100 glucose hyaline cast = likely suggestive of prerenal ATN combined    Reviewed renal ultrasound results  -No hydronephrosis or renal calculi.   - Urinary bladder wall thickness measuring 0.6 cm, possibly due to under  distension or chronic outlet obstruction.  Recommend correlation with  urinalysis to exclude acute cystitis. -Urinalysis is showing no signs of infection, to monitor,      Anemia worsening postop acute drop, monitor, transfuse as needed to keep hemoglobin more than 7, to improve renal perfusion    On losartan and hydrochlorothiazide  -Stop both  -BP currently controlled with Norvasc    Continue maintenance IV fluids hydration      D/C plans from renal POV - not ready due to worsening DANIEL, post-op,   - expect ~ 1-2 more days for plateau / improving DANIEL       3. H/O BPH-monitor bladder scan and reviewed renal US. 4.  Mild Anemia-follow Hgb  5. HTN- increased Amlodipine to 10mg  6. HLD-on statin. CK WNL.         Other problems:     Patient Active Problem List   Diagnosis    Closed displaced fracture of left femoral neck (HCC)    CKD (chronic kidney disease), stage III ([facility]    Type 2 diabetes mellitus ([facility]    Essential hypertension       : other supportive care :   - Check daily renal function panel with electrolytes-phosphorus  - Strict monitoring of I/Os, daily weight  - Renal feeds/diet  - Current medications reviewed. - Nephrotoxic medications have been discontinued. - Dose adjusted and appropriate. - Dose meds for during DANIEL eGFR <15 mL/min/1.73m2.   - Avoid heavy opioids due to renal failure - may use very low dose dilaudid / fentanyl with close monitoring of CNS and respiratory depression. Please refer to the orders. High Complexity. Multiple complex problems. Discussed with patient,  and treatment team-   Thank you for allowing me to participate in this patient's care. Please do not hesitate to contact me with any questions/concerns. We will follow along with you. Renata Homans, MD  [facility] [address] [place] [address] [place]: [phone] or [address]  Fax: [phone]      ================================================  ================================================      SUBJECTIVE:   -Status post left femoral neck fracture needing left total hip arthroplasty on yesterday    Hypoxia resolving, now on room air  Hypertension slightly uncontrolled only  - ROS reported: as mentioned in Subjective, HPI, CC, and all other 10 systems' review negative,   - PMH, [address], Social history: reviewed   - MEDICATIONS:  Prior to Admission Medications:  Medications Prior to Admission: SITagliptin-metFORMIN HCl ER (JANUMET XR) 100-1000 MG TB24, Take 100-1,000 mg by mouth daily  amLODIPine (NORVASC) 5 MG tablet, Take 5 mg by mouth daily  losartan-hydroCHLOROthiazide (HYZAAR) 100-12.5 MG per tablet, Take 1 tablet by mouth daily  Continuous Blood Gluc Sensor (FREESTYLE SHAINA 14 DAY SENSOR) MISC, by Does not apply route  atorvastatin (LIPITOR) 20 MG tablet, Take 20 mg by mouth daily.     glimepiride (AMARYL) 2 MG tablet, Take 4 mg by mouth every morning (before breakfast)   [DISCONTINUED] lisinopril (PRINIVIL;ZESTRIL) 10 MG tablet, Take 10 mg by mouth daily.        Scheduled Meds:   amLODIPine  5 mg Oral Daily    glimepiride  4 mg Oral QAM AC    sodium chloride flush  10 mL IntraVENous 2 times per day    acetaminophen  650 mg Oral Q6H    polyethylene glycol  17 g Oral Daily    bisacodyl  5 mg Oral Daily    sennosides-docusate sodium  1 tablet Oral BID    famotidine  20 mg Oral Daily    Or    famotidine (PEPCID) injection  20 mg IntraVENous Daily    aspirin  81 mg Oral BID    insulin lispro  0-12 Units SubCUTAneous TID WC    insulin lispro  0-6 Units SubCUTAneous Nightly    losartan  100 mg Oral Daily    And    hydroCHLOROthiazide  12.5 mg Oral Daily    atorvastatin  20 mg Oral Daily     Continuous Infusions:   sodium chloride 125 mL/hr at 02/25/22 1959    sodium chloride      dextrose 100 mL/hr (02/26/22 0033)     PRN Meds:.sodium chloride flush, sodium chloride, morphine **OR** morphine, oxyCODONE **OR** oxyCODONE, hydrOXYzine, ondansetron **OR** ondansetron, glucose, glucagon (rDNA), dextrose, dextrose bolus (hypoglycemia) **OR** dextrose bolus (hypoglycemia), benzocaine-menthol        OBJECTIVE:   PHYSICAL EXAM:  Patient Vitals for the past 24 hrs:   BP Temp Temp src Pulse Resp SpO2   02/26/22 0809 (!) 145/63 98 °F (36.7 °C) Oral 92 18 98 %   02/26/22 0430 (!) 165/77 98.1 °F (36.7 °C) Oral 97 19 97 %   02/26/22 0000 (!) 148/78 98 °F (36.7 °C) Oral 98 18 97 %   02/25/22 2300 132/73 98.1 °F (36.7 °C) Oral 91 15 98 %   02/25/22 2200 (!) 162/79 98.3 °F (36.8 °C) Oral 93 16 95 %   02/25/22 2045 (!) 141/74 98.3 °F (36.8 °C) Oral 94 17 98 %   02/25/22 2015 (!) 153/77 98.1 °F (36.7 °C) Oral 98 17 97 %   02/25/22 2000 (!) 176/73 98.4 °F (36.9 °C) Temporal 98 18 94 %   02/25/22 1950 -- -- -- 102 18 93 %   02/25/22 1945 (!) 198/82 -- -- 101 19 95 %   02/25/22 1940 (!) 208/92 -- -- 103 19 94 %   02/25/22 1935 (!) 219/90 -- -- 106 21 97 %   02/25/22 1929 (!) 190/90 97.1 °F (36.2 °C) Temporal 104 18 95 %   02/25/22 1642 (!) 192/90 -- -- -- -- --   02/25/22 1636 -- 98.1 °F (36.7 °C) Temporal 104 18 95 %   02/25/22 1430 (!) 162/90 98.6 °F (37 °C) Oral 92 18 92 %       Intake/Output Summary (Last 24 hours) at 2/26/2022 1034  Last data filed at 2/25/2022 1224  Gross per 24 hour   Intake --   Output 464 ml   Net -464 ml       General: Awake, Alert,    HENT: Atraumatic, normocephalic   Eyes: Normal conjunctiva, Non-incteral sclera. Neck: Supple, JVD not visible. CVS:  Heart sounds are normal. No murmurs. .  RS: Normal respiratory efforts,  Lung fields are clear . Abd: Soft , bowel sounds are normal, and no tenderness to palpation. Skin: No rash ,   bruises,   CNS: Awake Oriented  , No focal.   Extremities/MSK:   Edema, no cyanosis. DATA:  Diagnostic tests reviewed for today's visit:    Recent Labs     02/24/22  1010 02/25/22  0445 02/26/22  0519   WBC 14.2* 10.5 11.8*   HCT 33.5* 36.4* 25.9*    255 194     Iron Saturation:  No components found for: PERCENTFE  FERRITIN:  No results found for: FERRITIN  IRON:  No results found for: IRON  TIBC:  No results found for: TIBC    Recent Labs     02/24/22  1010 02/25/22  0445 02/26/22  0519    140 136   K 4.7 4.1 5.1    102 102   CO2 19* 26 19*   BUN 38* 38* 49*   CREATININE 1.9* 2.2* 2.8*     Recent Labs     02/24/22  1010 02/25/22  0445 02/26/22  0519   CALCIUM 8.7 9.3 8.0*     No results for input(s): PH, PCO2, PO2 in the last 72 hours.     Invalid input(s): Gardiner Rowe    ABG:  No results found for: PH, PCO2, PO2, HCO3, BE, THGB, TCO2, O2SAT  VBG:  No results found for: PHVEN, RKO5YVZ, BEVEN, F4FSHFFQ    LDH:  No results found for: LDH  Uric Acid:  No results found for: LABURIC, URICACID    PT/INR:    Lab Results   Component Value Date    PROTIME 11.0 02/24/2022    INR 0.97 02/24/2022     Warfarin PT/INR:  No components found for: PTPATWAR, PTINRWAR  PTT: Lab Results   Component Value Date    APTT 33.2 02/24/2022   [APTT}    Last 3 Troponin:  No results found for: TROPONINI    U/A:    Lab Results   Component Value Date    COLORU YELLOW 02/25/2022    PROTEINU 100 02/25/2022    PHUR 5.0 02/25/2022    WBCUA 2 02/25/2022    RBCUA 2 02/25/2022    CLARITYU Clear 02/25/2022    SPECGRAV 1.023 02/25/2022    LEUKOCYTESUR Negative 02/25/2022    UROBILINOGEN 0.2 02/25/2022    BILIRUBINUR Negative 02/25/2022    BLOODU TRACE 02/25/2022    GLUCOSEU 100 02/25/2022     Microalbumen/Creatinine ratio:  No components found for: RUCREAT  24 Hour Urine for Protein:  No components found for: RAWUPRO, UHRS3, FPUM42OP, UTV3  24 Hour Urine for Creatinine Clearance:  No components found for: CREAT4, UHRS10, UTV10  Urine Toxicology: No components found for: IAMMENTA, IBARBIT, IBENZO, ICOCAINE, IMARTHC, IOPIATES, IPHENCYC    HgBA1c:  No results found for: LABA1C  RPR:  No results found for: RPR  HIV:  No results found for: HIV  KATTY:  No results found for: ANATITER, KATTY  RF:  No results found for: RF  DSDNA:  No components found for: DNA  AMYLASE:  No results found for: AMYLASE  LIPASE:  No results found for: LIPASE  Fibrinogen Level:  No components found for: FIB      BELOW MENTIONED RADIOLOGY STUDIES REVIEWED BY ME:    XR PELVIS (1-2 VIEWS)    Result Date: 2/25/2022  EXAMINATION: ONE XRAY VIEW OF THE PELVIS 2/25/2022 7:43 pm COMPARISON: 2/24/2022 HISTORY: ORDERING SYSTEM PROVIDED HISTORY: post op KAREEM TECHNOLOGIST PROVIDED HISTORY: Of operative side while in recovery room Reason for exam:->post op KAREEM Reason for Exam: left-sided total hip FINDINGS: A left total hip replacement has been inserted. Alignment is normal.  There is no acute interval fracture. There is adjacent soft tissue gas. Mild osteoarthritis of the right hip. Left hip prosthesis in normal alignment on the single view. No acute interval fracture. Immediate postoperative changes.      XR HIP LEFT (1 VIEW)    Result Date: 2/25/2022  Radiology exam is complete. No Radiologist dictation. Please follow up with ordering provider. XR FEMUR LEFT (MIN 2 VIEWS)    Result Date: 2/24/2022  EXAMINATION: 4 XRAY VIEWS OF THE LEFT FEMUR; ONE XRAY VIEW OF THE PELVIS AND TWO XRAY VIEWS LEFT HIP 2/24/2022 9:10 am COMPARISON: None. HISTORY: ORDERING SYSTEM PROVIDED HISTORY: fall TECHNOLOGIST PROVIDED HISTORY: Reason for exam:->fall; ORDERING SYSTEM PROVIDED HISTORY: injury TECHNOLOGIST PROVIDED HISTORY: Reason for exam:->injury FINDINGS: There is an acute minimally displaced left subcapital femoral fracture with mild-to-moderate varus angulation. The bones are slightly demineralized. There is no dislocation. There are no bony destructive lesions. There is mild bilateral hip osteoarthritis. Vascular calcifications are noted. 1. Acute left femoral neck fracture. US RENAL COMPLETE    Result Date: 2/25/2022  EXAMINATION: RETROPERITONEAL ULTRASOUND OF THE KIDNEYS AND URINARY BLADDER 2/25/2022 COMPARISON: None HISTORY: ORDERING SYSTEM PROVIDED HISTORY: DANIEL on CKD 3 TECHNOLOGIST PROVIDED HISTORY: Reason for exam:->DANIEL on CKD 3 Reason for Exam: DANIEL ON CKD 3 Additional signs and symptoms: DM II Relevant Medical/Surgical History: recent left hip fracture 2 days ago FINDINGS: Kidneys: The right kidney measures 10.6 cm in length and the left kidney measures 9.9 cm in length. Kidneys demonstrate normal cortical echogenicity. No evidence of hydronephrosis or intrarenal stones. Bladder: Under distended urinary bladder with wall thickness measuring 0.6 cm. The prostate appears enlarged and tents the urinary bladder. The prostate measures 4.4 x 5.4 x 5.2 cm. No hydronephrosis or renal calculi. Urinary bladder wall thickness measuring 0.6 cm, possibly due to under distension or chronic outlet obstruction. Recommend correlation with urinalysis to exclude acute cystitis.      XR CHEST PORTABLE    Result Date: 2/24/2022  EXAMINATION: ONE XRAY VIEW OF THE CHEST 2/24/2022 10:09 am COMPARISON: None. HISTORY: ORDERING SYSTEM PROVIDED HISTORY: hip fracture TECHNOLOGIST PROVIDED HISTORY: Reason for exam:->hip fracture Reason for Exam: hip fracture FINDINGS: HEART/MEDIASTINUM: The cardiomediastinal silhouette is within normal limits. PLEURA/LUNGS: There are no focal consolidations or pleural effusions. There is no appreciable pneumothorax. BONES/SOFT TISSUE: No acute abnormality. No radiographic evidence of acute pulmonary disease. XR HIP 2-3 VW W PELVIS LEFT    Result Date: 2/24/2022  EXAMINATION: 4 XRAY VIEWS OF THE LEFT FEMUR; ONE XRAY VIEW OF THE PELVIS AND TWO XRAY VIEWS LEFT HIP 2/24/2022 9:10 am COMPARISON: None. HISTORY: ORDERING SYSTEM PROVIDED HISTORY: fall TECHNOLOGIST PROVIDED HISTORY: Reason for exam:->fall; ORDERING SYSTEM PROVIDED HISTORY: injury TECHNOLOGIST PROVIDED HISTORY: Reason for exam:->injury FINDINGS: There is an acute minimally displaced left subcapital femoral fracture with mild-to-moderate varus angulation. The bones are slightly demineralized. There is no dislocation. There are no bony destructive lesions. There is mild bilateral hip osteoarthritis. Vascular calcifications are noted. 1. Acute left femoral neck fracture.

## 2022-02-26 NOTE — CARE COORDINATION
Discharge Planning Note:      - PT/OT pending    - C vs. SNF    Will continue to follow.     ERNESTINE Fischer RN      Phone: [phone]

## 2022-02-27 VITALS
DIASTOLIC BLOOD PRESSURE: 74 MMHG | BODY MASS INDEX: 26.37 KG/M2 | WEIGHT: 174 LBS | OXYGEN SATURATION: 94 % | SYSTOLIC BLOOD PRESSURE: 170 MMHG | TEMPERATURE: 98.2 F | HEART RATE: 85 BPM | RESPIRATION RATE: 16 BRPM | HEIGHT: 68 IN

## 2022-02-27 LAB
ALBUMIN SERPL-MCNC: 3.4 G/DL (ref 3.4–5)
ANION GAP SERPL CALCULATED.3IONS-SCNC: 10 MMOL/L (ref 3–16)
BASOPHILS ABSOLUTE: 0 K/UL (ref 0–0.2)
BASOPHILS RELATIVE PERCENT: 0.3 %
BUN BLDV-MCNC: 42 MG/DL (ref 7–20)
CALCIUM SERPL-MCNC: 8 MG/DL (ref 8.3–10.6)
CHLORIDE BLD-SCNC: 110 MMOL/L (ref 99–110)
CO2: 22 MMOL/L (ref 21–32)
CREAT SERPL-MCNC: 2.2 MG/DL (ref 0.8–1.3)
EOSINOPHILS ABSOLUTE: 0.4 K/UL (ref 0–0.6)
EOSINOPHILS RELATIVE PERCENT: 3.7 %
GFR AFRICAN AMERICAN: 36
GFR NON-AFRICAN AMERICAN: 30
GLUCOSE BLD-MCNC: 220 MG/DL (ref 70–99)
GLUCOSE BLD-MCNC: 227 MG/DL (ref 70–99)
GLUCOSE BLD-MCNC: 241 MG/DL (ref 70–99)
HCT VFR BLD CALC: 23.2 % (ref 40.5–52.5)
HEMOGLOBIN: 7.8 G/DL (ref 13.5–17.5)
LYMPHOCYTES ABSOLUTE: 1.1 K/UL (ref 1–5.1)
LYMPHOCYTES RELATIVE PERCENT: 11.4 %
MCH RBC QN AUTO: 31.5 PG (ref 26–34)
MCHC RBC AUTO-ENTMCNC: 33.4 G/DL (ref 31–36)
MCV RBC AUTO: 94.1 FL (ref 80–100)
MONOCYTES ABSOLUTE: 1 K/UL (ref 0–1.3)
MONOCYTES RELATIVE PERCENT: 9.8 %
NEUTROPHILS ABSOLUTE: 7.3 K/UL (ref 1.7–7.7)
NEUTROPHILS RELATIVE PERCENT: 74.8 %
PDW BLD-RTO: 12.9 % (ref 12.4–15.4)
PERFORMED ON: ABNORMAL
PERFORMED ON: ABNORMAL
PHOSPHORUS: 2.7 MG/DL (ref 2.5–4.9)
PLATELET # BLD: 184 K/UL (ref 135–450)
PMV BLD AUTO: 7.7 FL (ref 5–10.5)
POTASSIUM SERPL-SCNC: 4.4 MMOL/L (ref 3.5–5.1)
RBC # BLD: 2.47 M/UL (ref 4.2–5.9)
SODIUM BLD-SCNC: 142 MMOL/L (ref 136–145)
WBC # BLD: 9.8 K/UL (ref 4–11)

## 2022-02-27 PROCEDURE — 80069 RENAL FUNCTION PANEL: CPT

## 2022-02-27 PROCEDURE — 85025 COMPLETE CBC W/AUTO DIFF WBC: CPT

## 2022-02-27 PROCEDURE — 2580000003 HC RX 258: Performed by: INTERNAL MEDICINE

## 2022-02-27 PROCEDURE — 6370000000 HC RX 637 (ALT 250 FOR IP): Performed by: ORTHOPAEDIC SURGERY

## 2022-02-27 PROCEDURE — 2500000003 HC RX 250 WO HCPCS: Performed by: ORTHOPAEDIC SURGERY

## 2022-02-27 PROCEDURE — 2580000003 HC RX 258: Performed by: ORTHOPAEDIC SURGERY

## 2022-02-27 PROCEDURE — 36415 COLL VENOUS BLD VENIPUNCTURE: CPT

## 2022-02-27 RX ORDER — SENNA AND DOCUSATE SODIUM 50; 8.6 MG/1; MG/1
1 TABLET, FILM COATED ORAL 2 TIMES DAILY
Qty: 10 TABLET | Refills: 0 | Status: SHIPPED | OUTPATIENT
Start: 2022-02-27 | End: 2022-03-04

## 2022-02-27 RX ORDER — SODIUM CHLORIDE 9 MG/ML
INJECTION, SOLUTION INTRAVENOUS CONTINUOUS
Status: DISCONTINUED | OUTPATIENT
Start: 2022-02-27 | End: 2022-02-27 | Stop reason: HOSPADM

## 2022-02-27 RX ORDER — AMLODIPINE BESYLATE 5 MG/1
10 TABLET ORAL DAILY
Qty: 30 TABLET | Refills: 3 | Status: SHIPPED | OUTPATIENT
Start: 2022-02-27 | End: 2022-05-13 | Stop reason: SDUPTHER

## 2022-02-27 RX ADMIN — ATORVASTATIN CALCIUM 20 MG: 20 TABLET, FILM COATED ORAL at 08:32

## 2022-02-27 RX ADMIN — ACETAMINOPHEN 325MG 650 MG: 325 TABLET ORAL at 08:32

## 2022-02-27 RX ADMIN — ACETAMINOPHEN 325MG 650 MG: 325 TABLET ORAL at 15:38

## 2022-02-27 RX ADMIN — FAMOTIDINE 20 MG: 10 INJECTION, SOLUTION INTRAVENOUS at 08:33

## 2022-02-27 RX ADMIN — AMLODIPINE BESYLATE 5 MG: 5 TABLET ORAL at 08:34

## 2022-02-27 RX ADMIN — GLIMEPIRIDE 4 MG: 2 TABLET ORAL at 06:46

## 2022-02-27 RX ADMIN — POLYETHYLENE GLYCOL 3350 17 G: 17 POWDER, FOR SOLUTION ORAL at 08:33

## 2022-02-27 RX ADMIN — SENNOSIDES AND DOCUSATE SODIUM 1 TABLET: 50; 8.6 TABLET ORAL at 08:32

## 2022-02-27 RX ADMIN — OXYCODONE 10 MG: 5 TABLET ORAL at 15:38

## 2022-02-27 RX ADMIN — INSULIN LISPRO 4 UNITS: 100 INJECTION, SOLUTION INTRAVENOUS; SUBCUTANEOUS at 09:58

## 2022-02-27 RX ADMIN — SODIUM CHLORIDE: 9 INJECTION, SOLUTION INTRAVENOUS at 03:11

## 2022-02-27 RX ADMIN — OXYCODONE 5 MG: 5 TABLET ORAL at 08:32

## 2022-02-27 RX ADMIN — ASPIRIN 81 MG: 81 TABLET, COATED ORAL at 08:32

## 2022-02-27 RX ADMIN — OXYCODONE 5 MG: 5 TABLET ORAL at 03:09

## 2022-02-27 RX ADMIN — SODIUM CHLORIDE: 9 INJECTION, SOLUTION INTRAVENOUS at 11:32

## 2022-02-27 RX ADMIN — BISACODYL 5 MG: 5 TABLET, COATED ORAL at 08:33

## 2022-02-27 RX ADMIN — INSULIN LISPRO 4 UNITS: 100 INJECTION, SOLUTION INTRAVENOUS; SUBCUTANEOUS at 13:11

## 2022-02-27 RX ADMIN — ACETAMINOPHEN 325MG 650 MG: 325 TABLET ORAL at 03:09

## 2022-02-27 ASSESSMENT — PAIN DESCRIPTION - ORIENTATION
ORIENTATION: LEFT

## 2022-02-27 ASSESSMENT — PAIN SCALES - GENERAL
PAINLEVEL_OUTOF10: 5
PAINLEVEL_OUTOF10: 6
PAINLEVEL_OUTOF10: 7
PAINLEVEL_OUTOF10: 4
PAINLEVEL_OUTOF10: 6

## 2022-02-27 ASSESSMENT — PAIN DESCRIPTION - LOCATION
LOCATION: HIP

## 2022-02-27 ASSESSMENT — PAIN DESCRIPTION - PAIN TYPE
TYPE: SURGICAL PAIN

## 2022-02-27 NOTE — PLAN OF CARE
Problem: Falls - Risk of:  Goal: Will remain free from falls  Description: Will remain free from falls  2/26/2022 1909 by Alesha Goins RN  Outcome: Ongoing  2/26/2022 0814 by Frank Mario RN  Outcome: Ongoing  Goal: Absence of physical injury  Description: Absence of physical injury  2/26/2022 1909 by Alesha Goins RN  Outcome: Ongoing  2/26/2022 0814 by Frank Mario RN  Outcome: Ongoing     Problem: Pain:  Goal: Pain level will decrease  Description: Pain level will decrease  2/26/2022 1909 by Alesha Goins RN  Outcome: Ongoing  2/26/2022 0814 by Frank Mario RN  Outcome: Ongoing  Goal: Control of acute pain  Description: Control of acute pain  2/26/2022 1909 by Alesha Goins RN  Outcome: Ongoing  2/26/2022 0814 by Frank Mario RN  Outcome: Ongoing  Goal: Control of chronic pain  Description: Control of chronic pain  2/26/2022 1909 by Alesha Goins RN  Outcome: Ongoing  2/26/2022 0814 by Frank Mario RN  Outcome: Ongoing     Problem: Skin Integrity:  Goal: Will show no infection signs and symptoms  Description: Will show no infection signs and symptoms  2/26/2022 1909 by Alesha Goins RN  Outcome: Ongoing  2/26/2022 0814 by Frank Mario RN  Outcome: Ongoing  Goal: Absence of new skin breakdown  Description: Absence of new skin breakdown  2/26/2022 1909 by Alesha Goins RN  Outcome: Ongoing  2/26/2022 0814 by Frank Mario RN  Outcome: Ongoing

## 2022-02-27 NOTE — CARE COORDINATION
Discharge Planning Note:      - Home health care referral placed with [facility], Denied because of out of state insurance. Krista Alvarado stated she will contact [facility]. - I called [facility], no answer the phone.    - Faxed the order to [facility], placed the faxed packet on Jacque's desk. Will continue to follow.     ERNESTINE Salas RN      Phone: [phone]

## 2022-02-27 NOTE — PROGRESS NOTES
Hospitalist Progress Note      PCP: Referring Not In System (Inactive)    Date of Admission: 2/24/2022    LOS: 3    Chief Complaint:   Chief Complaint   Patient presents with    Fall     Pt brought to ED by ems from home after tripping down carpeted stairs inside his home. Pt denies hitting his head, denies loc. Has abrasion to left forearm. Pt c/o left hip pain, external rotation noted. Case Summary:   80 y.o. male with history of hypertension, type 2 diabetes, stage III CKD who had a mechanical fall going down stairs at home slipping on the steps sustaining acute left femoral neck fracture complicated by acute on chronic kidney injury        Active Hospital Problems    Diagnosis Date Noted    Closed displaced fracture of left femoral neck ([address] [S72.002A] 02/24/2022    CKD (chronic kidney disease), stage III ([address] [N18.30] 02/24/2022    Type 2 diabetes mellitus ([address] [E11.9] 02/24/2022    Essential hypertension [I10] 02/24/2022         Principal Problem:    Closed displaced fracture of left femoral neck ([address]): status post hip surgery. Significant amount of pain earlier this morning requiring repeated analgesia  -We will give IV morphine  -Continue oral pain management  -Continue PT OT with weightbearing. Orthopedics    Active Problems:   Acute on CKD (chronic kidney disease), stage III ([address]): No labs this morning. Creatinine yesterday at 2.8  -Continue IV hydration  -Nephrology consult  -Monitor renal function electrolytes and will check BMP this morning  -Avoid nephrotoxic agent and continue to hold diuretics and ACE inhibitor/ARB      Type 2 diabetes mellitus ([address]): Sliding scale insulin for glycemic control    Essential hypertension: On amlodipine.   Hyzaar stopped due to acute kidney injury        Medications:  Reviewed  Infusion Medications    sodium chloride      sodium chloride      dextrose 100 mL/hr (02/26/22 0037)     Scheduled Medications    amLODIPine  5 mg Oral Daily    glimepiride  4 mg Oral QAM AC    sodium chloride flush  10 mL IntraVENous 2 times per day    acetaminophen  650 mg Oral Q6H    polyethylene glycol  17 g Oral Daily    bisacodyl  5 mg Oral Daily    sennosides-docusate sodium  1 tablet Oral BID    famotidine  20 mg Oral Daily    Or    famotidine (PEPCID) injection  20 mg IntraVENous Daily    aspirin  81 mg Oral BID    insulin lispro  0-12 Units SubCUTAneous TID WC    insulin lispro  0-6 Units SubCUTAneous Nightly    atorvastatin  20 mg Oral Daily     PRN Meds: sodium chloride flush, sodium chloride, morphine **OR** morphine, oxyCODONE **OR** oxyCODONE, hydrOXYzine, ondansetron **OR** ondansetron, glucose, glucagon (rDNA), dextrose, dextrose bolus (hypoglycemia) **OR** dextrose bolus (hypoglycemia), benzocaine-menthol      DVT Prophylaxis: Subcut enoxaparin  Diet: ADULT DIET; Regular  Code Status: Prior    Dispo: Anticipate discharge in the next 24-48 hrs    ____________________________________________________________________________    Subjective:   Overnight Events:   Uneventful overnight  Increased pain this morning responded to analgesics  No labs this morning      Physical Exam:  BP (!) 170/74   Pulse 85   Temp 98.2 °F (36.8 °C) (Oral)   Resp 16   Ht 5' 8\" (1.727 m)   Wt 174 lb (78.9 kg)   SpO2 94%   BMI 26.46 kg/m²   General appearance: No apparent distress, appears stated age and cooperative. HEENT: Normocephalic, atraumatic, MMM, No sclera icterus/conjuctival palor  Neck: Supple, no thyromegally. No jugular venous distention. Respiratory:  Normal respiratory effort. Clear to auscultation, no Rales/Wheezes/Rhonchi. Cardiovascular: S1/S2 without murmurs, rubs or gallops. RRR  Abdomen: Soft, non-tender, non-distended, bowel sounds present. Musculoskeletal: No clubbing, cyanosis or edema bilaterally. Skin: Skin color, texture, turgor normal.  No rashes or lesions.   Neurologic:  Cranial nerves: II-XII intact, LB, No focal sensory/motor deficits  Psychiatric: Alert and oriented, thought content appropriate  Capillary Refill: Brisk,< 3 seconds   Peripheral Pulses: +2 palpable, equal bilaterally       Intake/Output Summary (Last 24 hours) at 2/27/2022 1045  Last data filed at 2/27/2022 0000  Gross per 24 hour   Intake 3392.1 ml   Output 250 ml   Net 3142.1 ml       Labs:   Recent Labs     02/25/22  0445 02/26/22 0519   WBC 10.5 11.8*   HGB 12.4* 8.6*   HCT 36.4* 25.9*    194      Recent Labs     02/25/22  0445 02/26/22  0519    136   K 4.1 5.1    102   CO2 26 19*   BUN 38* 49*   CREATININE 2.2* 2.8*   CALCIUM 9.3 8.0*     Recent Labs     02/25/22 0445   CKTOTAL 101       Urinalysis:    Lab Results   Component Value Date    NITRU Negative 02/25/2022    WBCUA 2 02/25/2022    RBCUA 2 02/25/2022    BLOODU TRACE 02/25/2022    SPECGRAV 1.023 02/25/2022    GLUCOSEU 100 02/25/2022       Radiology:  XR PELVIS (1-2 VIEWS)   Final Result   Left hip prosthesis in normal alignment on the single view. No acute interval fracture. Immediate postoperative changes. XR HIP LEFT (1 VIEW)   Final Result      US RENAL COMPLETE   Final Result   No hydronephrosis or renal calculi. Urinary bladder wall thickness measuring 0.6 cm, possibly due to under   distension or chronic outlet obstruction. Recommend correlation with   urinalysis to exclude acute cystitis. XR CHEST PORTABLE   Final Result   No radiographic evidence of acute pulmonary disease. XR HIP 2-3 VW W PELVIS LEFT   Final Result   1. Acute left femoral neck fracture. XR FEMUR LEFT (MIN 2 VIEWS)   Final Result   1. Acute left femoral neck fracture. Vilma Carroll MD      Please excuse brevity and/or typos. This report was transcribed using voice recognition software. Every effort was made to ensure accuracy, however, inadvertent computerized transcription errors may be present.

## 2022-02-27 NOTE — DISCHARGE INSTR - COC
Continuity of Care Form    Patient Name: Lam Perez   :  1950  MRN:  [id number]    Admit date:  2022  Discharge date:  2022    Code Status Order: Prior   Advance Directives:      Admitting Physician:  Andres Draper MD  PCP: Referring Not In System (Inactive)    [facility]-0001/9262-19  Discharging Unit Phone Number: [phone]    Emergency Contact:   Extended Emergency Contact Information  Primary Emergency Contact: Henrietta Brown  Address: [facility] [place], Radha Trent [facility] [address] [facility] [address] [place] [address] Phone: [phone]  Relation: Spouse    Past Surgical History:  Past Surgical History:   Procedure Laterality Date    ANKLE FRACTURE SURGERY         Immunization History: There is no immunization history on file for this patient. Active Problems:  Patient Active Problem List   Diagnosis Code    Closed displaced fracture of left femoral neck ([facility]) S72.002A    CKD (chronic kidney disease), stage III ([facility]) N18.30    Type 2 diabetes mellitus ([facility] [address] E11.9    Essential hypertension I10       Isolation/Infection:   Isolation            No Isolation          Patient Infection Status       None to display            Nurse Assessment:  Last Vital Signs: BP (!) 170/74   Pulse 85   Temp 98.2 °F (36.8 °C) (Oral)   Resp 16   Ht 5' 8\" (1.727 m)   Wt 174 lb (78.9 kg)   SpO2 94%   BMI 26.46 kg/m²     Last documented pain score (0-10 scale): Pain Level: 6  Last Weight:   Wt Readings from Last 1 Encounters:   22 174 lb (78.9 kg)     Mental Status:  oriented    IV Access:  - None    Nursing Mobility/ADLs:  Walking   Assisted  Transfer  Assisted  Bathing  Independent  Dressing  Independent  Toileting  Assisted  Feeding  [address]  Independent  Med Delivery   whole    Wound Care Documentation and Therapy:        Elimination:  Continence:    Bowel: No  Bladder: No  Urinary Catheter: None Colostomy/Ileostomy/Ileal Conduit: No       Date of Last BM: 2/26/2022    Intake/Output Summary (Last 24 hours) at 2/27/2022 1414  Last data filed at 2/27/2022 0000  Gross per 24 hour   Intake 3152.1 ml   Output 250 ml   Net 2902.1 ml     I/O last 3 completed shifts: In: 3392.1 [P.O.:480; I.V.:2846.5; IV Piggyback:65.6]  Out: 250 [Urine:250]    Safety Concerns: At Risk for Falls    Impairments/Disabilities:      None    Nutrition Therapy:  Current Nutrition Therapy:   - Oral Diet:  General    Routes of Feeding: Oral  Liquids: No Restrictions  Daily Fluid Restriction: no  Last Modified Barium Swallow with Video (Video Swallowing Test): not done    Treatments at the Time of Hospital Discharge:   Respiratory Treatments: n/a  Oxygen Therapy:  is not on home oxygen therapy.   Ventilator:    - No ventilator support    Rehab Therapies: Physical Therapy and Occupational Therapy  Weight Bearing Status/Restrictions: No weight bearing restirctions  Other Medical Equipment (for information only, NOT a DME order):  walker  Other Treatments:   HOME HEALTH CARE: LEVEL 1 STANDARD     -Initial home health evaluation to occur within 24-48 hours, in patient home    -Home health agency to establish plan of care for patient over 60 day period    -Medication Reconciliation    -PCP Visit scheduled within seven days of discharge    -PT/OT to evaluate with goal of regaining prior level of functioning    -OT to evaluate if patient has [address] needs for personal care      Patient's personal belongings (please select all that are sent with patient):  None    RN SIGNATURE:  Electronically signed by Ortega Bull RN on 2/27/22 at 2:18 PM EST    CASE MANAGEMENT/SOCIAL WORK SECTION    Inpatient Status Date: 02/24/2022    Readmission Risk Assessment Score:  Readmission Risk              Risk of Unplanned Readmission:  18           Discharging to Facility/ Agency  Name: Yvon [facility]RE[facility] will call for Appointment  Phone: [phone]  Fax: [phone]     Dialysis Facility (if applicable)   Name:  Address:  Dialysis Schedule:  Phone:  Fax:    / signature: Electronically signed by Pascale Guerrero RN on 2/27/22 at 2:39 PM EST    PHYSICIAN SECTION    Prognosis: good      Condition at Discharge: stable    Rehab Potential (if transferring to Rehab): good    Recommended Labs or Other Treatments After Discharge: ***    Physician Certification: I certify the above information and transfer of Sarahy Dye  is necessary for the continuing treatment of the diagnosis listed and that he requires home care for 30 days.      Update Admission H&P: Updated  PHYSICIAN SIGNATURE:  Maico He MD on 02/27/22

## 2022-02-27 NOTE — PLAN OF CARE
Problem: Falls - Risk of:  Goal: Will remain free from falls  Description: Will remain free from falls  2/27/2022 1540 by Bronson Sánchez RN  Outcome: Completed     Problem: Falls - Risk of:  Goal: Absence of physical injury  Description: Absence of physical injury  2/27/2022 1540 by Bronson Sánchez RN  Outcome: Completed     Problem: Pain:  Goal: Pain level will decrease  Description: Pain level will decrease  2/27/2022 1540 by Bronson Sánchez RN  Outcome: Completed     Problem: Pain:  Goal: Control of acute pain  Description: Control of acute pain  2/27/2022 1540 by Bronson Sánchez RN  Outcome: Completed     Problem: Pain:  Goal: Control of chronic pain  Description: Control of chronic pain  2/27/2022 1540 by Bronson Sánchez RN  Outcome: Completed     Problem: Skin Integrity:  Goal: Will show no infection signs and symptoms  Description: Will show no infection signs and symptoms  2/27/2022 1540 by Bronson Sánchez RN  Outcome: Completed     Problem: Skin Integrity:  Goal: Absence of new skin breakdown  Description: Absence of new skin breakdown  2/27/2022 1540 by Bronson Sánchez RN  Outcome: Completed

## 2022-02-27 NOTE — DISCHARGE SUMMARY
Hospital Medicine Discharge Summary    Patient ID: Jose A Gastelum      Patient's PCP: Referring Not In System (Inactive)    Admit Date: 2/24/2022     Discharge Date:   2/27/2022     Admitting Provider: Rasta Santoro MD     Discharge Provider: Rasta Santoro MD     Discharge Diagnoses: Active Hospital Problems    Diagnosis     [facility] [S72.002A]     CKD (chronic kidney disease), stage III (HCC) [N18.30]     Type 2 diabetes mellitus ([facility] [address] [E11.9]     Essential hypertension [I10]        The patient was seen and examined on day of discharge and this discharge summary is in conjunction with any daily progress note from day of discharge. Hospital Course: The patient is a 80 y. o. male with history of hypertension, type 2 diabetes, stage III CKD who had a mechanical fall going down stairs at home slipping on the steps sustaining acute left femoral neck fracture s/p Left hip arthroplasty. He was complicated by acute on chronic kidney injury. Creatinine peaked at 2.8 and has trended to baseline. Today at 2.2. nephrology was consulted and Hyzaar has been held due to renal impairment. He will follow up with primary nephrologist at discharge. His amlodipine dose increased to 10mg daily             Physical Exam Performed:     BP (!) 170/74   Pulse 85   Temp 98.2 °F (36.8 °C) (Oral)   Resp 16   Ht 5' 8\" (1.727 m)   Wt 174 lb (78.9 kg)   SpO2 94%   BMI 26.46 kg/m²       General appearance:  No apparent distress, appears stated age and cooperative. HEENT:  Normal cephalic, atraumatic without obvious deformity. Pupils equal, round, and reactive to light. Extra ocular muscles intact. Conjunctivae/corneas clear. Neck: Supple, with full range of motion. No jugular venous distention. Trachea midline. Respiratory:  Normal respiratory effort. Clear to auscultation, bilaterally without Rales/Wheezes/Rhonchi.   Cardiovascular:  Regular rate and rhythm with normal S1/S2 without murmurs, rubs or gallops. Abdomen: Soft, non-tender, non-distended with normal bowel sounds. Musculoskeletal:  No clubbing, cyanosis or edema bilaterally. Full range of motion without deformity. Skin: Skin color, texture, turgor normal.  No rashes or lesions. Neurologic:  Neurovascularly intact without any focal sensory/motor deficits. Cranial nerves: II-XII intact, grossly non-focal.  Psychiatric:  Alert and oriented, thought content appropriate, normal insight  Capillary Refill: Brisk,< 3 seconds   Peripheral Pulses: +2 palpable, equal bilaterally       Labs: For convenience and continuity at follow-up the following most recent labs are provided:      CBC:    Lab Results   Component Value Date    WBC 9.8 02/27/2022    HGB 7.8 02/27/2022    HCT 23.2 02/27/2022     02/27/2022       Renal:    Lab Results   Component Value Date     02/27/2022    K 4.4 02/27/2022    K 5.1 02/26/2022     02/27/2022    CO2 22 02/27/2022    BUN 42 02/27/2022    CREATININE 2.2 02/27/2022    CALCIUM 8.0 02/27/2022    PHOS 2.7 02/27/2022         Significant Diagnostic Studies    Radiology:   XR PELVIS (1-2 VIEWS)   Final Result   Left hip prosthesis in normal alignment on the single view. No acute interval fracture. Immediate postoperative changes. XR HIP LEFT (1 VIEW)   Final Result      US RENAL COMPLETE   Final Result   No hydronephrosis or renal calculi. Urinary bladder wall thickness measuring 0.6 cm, possibly due to under   distension or chronic outlet obstruction. Recommend correlation with   urinalysis to exclude acute cystitis. XR CHEST PORTABLE   Final Result   No radiographic evidence of acute pulmonary disease. XR HIP 2-3 VW W PELVIS LEFT   Final Result   1. Acute left femoral neck fracture. XR FEMUR LEFT (MIN 2 VIEWS)   Final Result   1. Acute left femoral neck fracture.                 Consults:     IP CONSULT TO ORTHOPEDIC SURGERY  IP CONSULT TO HOSPITALIST  IP CONSULT TO SOCIAL WORK  IP CONSULT TO NEPHROLOGY  IP CONSULT TO SOCIAL WORK  IP CONSULT TO HOSPITALIST  IP CONSULT TO HOME CARE NEEDS    Disposition:  Home with Home care PT/OT     Condition at Discharge: Stable    Discharge Instructions/Follow-up:    PCP follow up in 1-2 weeks    Code Status:  Full    Activity: activity as tolerated    Diet: regular diet      Discharge Medications:     Current Discharge Medication List           Details   sennosides-docusate sodium (SENOKOT-S) 8.6-50 MG tablet Take 1 tablet by mouth 2 times daily for 5 days  Qty: 10 tablet, Refills: 0      HYDROcodone-acetaminophen (NORCO) 5-325 MG per tablet Take 1 tablet by mouth every 6 hours as needed for Pain for up to 5 days. Intended supply: 5 days. Take lowest dose possible to manage pain  Qty: 20 tablet, Refills: 0    Comments: Reduce doses taken as pain becomes manageable  Associated Diagnoses: S/P hip replacement, left      aspirin EC 81 MG EC tablet Take 1 tablet by mouth 2 times daily for 28 days After 28 days resume home dose of daily ASA  Qty: 28 tablet, Refills: 0      senna (SENOKOT) 8.6 MG tablet Take 1 tablet by mouth 2 times daily as needed for Constipation  Qty: 14 tablet, Refills: 0              Details   amLODIPine (NORVASC) 5 MG tablet Take 2 tablets by mouth daily  Qty: 30 tablet, Refills: 3              Details   SITagliptin-metFORMIN HCl ER (JANUMET XR) 100-1000 MG TB24 Take 100-1,000 mg by mouth daily      Continuous Blood Gluc Sensor (FREESTYLE SHAINA 14 DAY SENSOR) MISC by Does not apply route      atorvastatin (LIPITOR) 20 MG tablet Take 20 mg by mouth daily. glimepiride (AMARYL) 2 MG tablet Take 4 mg by mouth every morning (before breakfast)              Time Spent on discharge is more than 30 minutes in the examination, evaluation, counseling and review of medications and discharge plan. Signed:     Betito Guerra MD   2/27/2022      Thank you Referring Not In System (Inactive) for the opportunity to be involved in this patient's care. If you have any questions or concerns please feel free to contact me at [phone].

## 2022-02-27 NOTE — PLAN OF CARE
Problem: Falls - Risk of:  Goal: Will remain free from falls  Description: Will remain free from falls  2/27/2022 0824 by Frank Mario RN  Outcome: Ongoing     Problem: Falls - Risk of:  Goal: Absence of physical injury  Description: Absence of physical injury  2/27/2022 0824 by Frank Mario RN  Outcome: Ongoing     Problem: Pain:  Goal: Pain level will decrease  Description: Pain level will decrease  2/27/2022 0824 by Frank Mario RN  Outcome: Ongoing     Problem: Pain:  Goal: Control of acute pain  Description: Control of acute pain  2/27/2022 0824 by Frank Mario RN  Outcome: Ongoing     Problem: Pain:  Goal: Control of chronic pain  Description: Control of chronic pain  2/27/2022 0824 by Frank Mario RN  Outcome: Ongoing     Problem: Skin Integrity:  Goal: Will show no infection signs and symptoms  Description: Will show no infection signs and symptoms  2/27/2022 0824 by Frank Mario RN  Outcome: Ongoing     Problem: Skin Integrity:  Goal: Absence of new skin breakdown  Description: Absence of new skin breakdown  2/27/2022 0824 by Frank Mario RN  Outcome: Ongoing

## 2022-02-27 NOTE — PROGRESS NOTES
MD Joseph Jane MD Lilla Charon, MD               Office: [phone]                      Fax: [phone]          NEPHROLOGY INPATIENT PROGRESS NOTE:     PATIENT NAME: Indra Medina  : 1950  MRN: [id number]      IMPRESSION / RECOMMENDATIONS:    Admitted with:  Fall down stairs, initial encounter [W10.8XXA]  Closed left hip fracture, initial encounter ([facility] [address] [S72.002A]  Closed displaced fracture of left femoral neck ([address] [S72.002A]      1. S/P Fall- left femoral neck fracture needing left total hip arthroplasty on 2020    2. DANIEL on CKD 3-Crea was 1.8 on 2021. CKD probably from DM.  +H/O Retinopathy. DANIEL likely due to ATN, postop hemodynamic fluctuation and worsening anemia,    Post void residual very very minimal,  Urinalysis reviewed, showing trace blood, 100 protein, 100 glucose hyaline cast = likely suggestive of prerenal ATN combined    Reviewed renal ultrasound results  -No hydronephrosis or renal calculi.   - Urinary bladder wall thickness measuring 0.6 cm, possibly due to under  distension or chronic outlet obstruction.  Recommend correlation with  urinalysis to exclude acute cystitis.   -Urinalysis is showing no signs of infection, to monitor,      Anemia worsening postop acute drop, monitor, transfuse as needed to keep hemoglobin more than 7, to improve renal perfusion    On losartan and hydrochlorothiazide  -Stop both  -BP currently controlled with Norvasc    Continue maintenance IV fluids hydration           Pending labs were just resulted, showing significant improvement,   but hemoglobin slightly lower, so notify surgery , if they are okay with discharge, otherwise with improving renal function and closer to baseline okay for discharge from renal standpoint, holding losartan and hydrochlorothiazide    No previous nephrology evaluation even with advanced CKD, so encourage follow-up with us in about 2 weeks after discharge    : f/u w/ me at [facility] in 1 week after d/c. (I will arrange.)    Discussed with team also, hospitalist        3. H/O BPH-monitor bladder scan and reviewed renal US. 4.  Mild Anemia-follow Hgb  5. HTN- increased Amlodipine to 10mg  6. HLD-on statin. CK WNL. D/W pt, team - hospitalist       Other problems:     Patient Active Problem List   Diagnosis    Closed displaced fracture of left femoral neck ([facility]    CKD (chronic kidney disease), stage III ([facility]    Type 2 diabetes mellitus ([facility]    Essential hypertension       : other supportive care :   - Check daily renal function panel with electrolytes-phosphorus  - Strict monitoring of I/Os, daily weight  - Renal feeds/diet  - Current medications reviewed. - Nephrotoxic medications have been discontinued. - Dose adjusted and appropriate. - Dose meds for during DANILE eGFR <15 mL/min/1.73m2.   - Avoid heavy opioids due to renal failure - may use very low dose dilaudid / fentanyl with close monitoring of CNS and respiratory depression. Please refer to the orders. High Complexity. Multiple complex problems. Discussed with patient,  and treatment team-   Thank you for allowing me to participate in this patient's care. Please do not hesitate to contact me with any questions/concerns. We will follow along with you.       Fadumo Elizabeth MD  [facility] [address] [place] [address]: [phone] or Via LEYIO  Fax: [phone]      ================================================  ================================================      SUBJECTIVE:   -Status post left femoral neck fracture needing left total hip arthroplasty on Saturday     He feels \"fine\" so wants to leave  But had DANIEL     Hypoxia resolving, now on room air  Hypertension slightly uncontrolled only  - ROS reported: as mentioned in Subjective, HPI, CC, and all other 10 systems' review negative,   - PMH, [address], Social history: reviewed   - MEDICATIONS:  Prior to Admission Medications:  Medications Prior to Admission: SITagliptin-metFORMIN HCl ER (JANUMET XR) 100-1000 MG TB24, Take 100-1,000 mg by mouth daily  amLODIPine (NORVASC) 5 MG tablet, Take 5 mg by mouth daily  losartan-hydroCHLOROthiazide (HYZAAR) 100-12.5 MG per tablet, Take 1 tablet by mouth daily  Continuous Blood Gluc Sensor (FREESTYLE SHAINA 14 DAY SENSOR) MISC, by Does not apply route  atorvastatin (LIPITOR) 20 MG tablet, Take 20 mg by mouth daily. glimepiride (AMARYL) 2 MG tablet, Take 4 mg by mouth every morning (before breakfast)   [DISCONTINUED] lisinopril (PRINIVIL;ZESTRIL) 10 MG tablet, Take 10 mg by mouth daily.        Scheduled Meds:   amLODIPine  5 mg Oral Daily    glimepiride  4 mg Oral QAM AC    sodium chloride flush  10 mL IntraVENous 2 times per day    acetaminophen  650 mg Oral Q6H    polyethylene glycol  17 g Oral Daily    bisacodyl  5 mg Oral Daily    sennosides-docusate sodium  1 tablet Oral BID    famotidine  20 mg Oral Daily    Or    famotidine (PEPCID) injection  20 mg IntraVENous Daily    aspirin  81 mg Oral BID    insulin lispro  0-12 Units SubCUTAneous TID WC    insulin lispro  0-6 Units SubCUTAneous Nightly    atorvastatin  20 mg Oral Daily     Continuous Infusions:   sodium chloride      sodium chloride      dextrose 100 mL/hr (02/26/22 0037)     PRN Meds:.sodium chloride flush, sodium chloride, morphine **OR** morphine, oxyCODONE **OR** oxyCODONE, hydrOXYzine, ondansetron **OR** ondansetron, glucose, glucagon (rDNA), dextrose, dextrose bolus (hypoglycemia) **OR** dextrose bolus (hypoglycemia), benzocaine-menthol        OBJECTIVE:   PHYSICAL EXAM:  Patient Vitals for the past 24 hrs:   BP Temp Temp src Pulse Resp SpO2   02/27/22 0815 (!) 170/74 98.2 °F (36.8 °C) Oral 85 16 94 %   02/27/22 0359 131/69 98.2 °F (36.8 °C) Oral 90 16 92 %   02/27/22 0000 (!) 158/75 98 °F (36.7 °C) Oral 83 16 96 %   02/26/22 2008 (!) 156/67 98.4 °F (36.9 °C) Oral 104 16 99 %   02/26/22 1616 (!) 149/76 97.8 °F (36.6 °C) Oral 88 16 98 %   02/26/22 1200 (!) 144/70 98 °F (36.7 °C) Oral 90 17 --   02/26/22 1125 -- -- -- -- 16 96 %       Intake/Output Summary (Last 24 hours) at 2/27/2022 1101  Last data filed at 2/27/2022 0000  Gross per 24 hour   Intake 3392.1 ml   Output 250 ml   Net 3142.1 ml       General: Awake, Alert,    HENT: Atraumatic, normocephalic   Eyes: Normal conjunctiva, Non-incteral sclera. Neck: Supple, JVD not visible. CVS:  Heart sounds are normal. No murmurs. .  RS: Normal respiratory efforts,  Lung fields are clear . Abd: Soft , bowel sounds are normal, and no tenderness to palpation. Skin: No rash ,   bruises,   CNS: Awake Oriented  , No focal.   Extremities/MSK:   Edema, no cyanosis. DATA:  Diagnostic tests reviewed for today's visit:    Recent Labs     02/25/22 0445 02/26/22  0519   WBC 10.5 11.8*   HCT 36.4* 25.9*    194     Iron Saturation:  No components found for: PERCENTFE  FERRITIN:  No results found for: FERRITIN  IRON:  No results found for: IRON  TIBC:  No results found for: TIBC    Recent Labs     02/25/22 0445 02/26/22  0519    136   K 4.1 5.1    102   CO2 26 19*   BUN 38* 49*   CREATININE 2.2* 2.8*     Recent Labs     02/25/22 0445 02/26/22  0519   CALCIUM 9.3 8.0*     No results for input(s): PH, PCO2, PO2 in the last 72 hours.     Invalid input(s): Asim Mckoy    ABG:  No results found for: PH, PCO2, PO2, HCO3, BE, THGB, TCO2, O2SAT  VBG:  No results found for: PHVEN, ALP1MMG, BEVEN, R4OCZKNA    LDH:  No results found for: LDH  Uric Acid:  No results found for: LABURIC, URICACID    PT/INR:    Lab Results   Component Value Date    PROTIME 11.0 02/24/2022    INR 0.97 02/24/2022     Warfarin PT/INR:  No components found for: PTPATWAR, PTINRWAR  PTT:    Lab Results   Component Value Date    APTT 33.2 02/24/2022   [APTT}    Last 3 Troponin:  No results found for: TROPONINI    U/A:    Lab Results Component Value Date    COLORU YELLOW 02/25/2022    PROTEINU 100 02/25/2022    PHUR 5.0 02/25/2022    WBCUA 2 02/25/2022    RBCUA 2 02/25/2022    CLARITYU Clear 02/25/2022    SPECGRAV 1.023 02/25/2022    LEUKOCYTESUR Negative 02/25/2022    UROBILINOGEN 0.2 02/25/2022    BILIRUBINUR Negative 02/25/2022    BLOODU TRACE 02/25/2022    GLUCOSEU 100 02/25/2022     Microalbumen/Creatinine ratio:  No components found for: RUCREAT  24 Hour Urine for Protein:  No components found for: RAWUPRO, UHRS3, MQQD70AV, UTV3  24 Hour Urine for Creatinine Clearance:  No components found for: CREAT4, UHRS10, UTV10  Urine Toxicology: No components found for: IAMMENTA, IBARBIT, IBENZO, ICOCAINE, IMARTHC, IOPIATES, IPHENCYC    HgBA1c:  No results found for: LABA1C  RPR:  No results found for: RPR  HIV:  No results found for: HIV  KATTY:  No results found for: ANATITER, KATTY  RF:  No results found for: RF  DSDNA:  No components found for: DNA  AMYLASE:  No results found for: AMYLASE  LIPASE:  No results found for: LIPASE  Fibrinogen Level:  No components found for: FIB      BELOW MENTIONED RADIOLOGY STUDIES REVIEWED BY ME:    XR PELVIS (1-2 VIEWS)    Result Date: 2/25/2022  EXAMINATION: ONE XRAY VIEW OF THE PELVIS 2/25/2022 7:43 pm COMPARISON: 2/24/2022 HISTORY: ORDERING SYSTEM PROVIDED HISTORY: post op KAREEM TECHNOLOGIST PROVIDED HISTORY: Of operative side while in recovery room Reason for exam:->post op KAREEM Reason for Exam: left-sided total hip FINDINGS: A left total hip replacement has been inserted. Alignment is normal.  There is no acute interval fracture. There is adjacent soft tissue gas. Mild osteoarthritis of the right hip. Left hip prosthesis in normal alignment on the single view. No acute interval fracture. Immediate postoperative changes. XR HIP LEFT (1 VIEW)    Result Date: 2/25/2022  Radiology exam is complete. No Radiologist dictation. Please follow up with ordering provider.      XR FEMUR LEFT (MIN 2 VIEWS)    Result Date: 2/24/2022  EXAMINATION: 4 XRAY VIEWS OF THE LEFT FEMUR; ONE XRAY VIEW OF THE PELVIS AND TWO XRAY VIEWS LEFT HIP 2/24/2022 9:10 am COMPARISON: None. HISTORY: ORDERING SYSTEM PROVIDED HISTORY: fall TECHNOLOGIST PROVIDED HISTORY: Reason for exam:->fall; ORDERING SYSTEM PROVIDED HISTORY: injury TECHNOLOGIST PROVIDED HISTORY: Reason for exam:->injury FINDINGS: There is an acute minimally displaced left subcapital femoral fracture with mild-to-moderate varus angulation. The bones are slightly demineralized. There is no dislocation. There are no bony destructive lesions. There is mild bilateral hip osteoarthritis. Vascular calcifications are noted. 1. Acute left femoral neck fracture. US RENAL COMPLETE    Result Date: 2/25/2022  EXAMINATION: RETROPERITONEAL ULTRASOUND OF THE KIDNEYS AND URINARY BLADDER 2/25/2022 COMPARISON: None HISTORY: ORDERING SYSTEM PROVIDED HISTORY: DANIEL on CKD 3 TECHNOLOGIST PROVIDED HISTORY: Reason for exam:->DANIEL on CKD 3 Reason for Exam: DANIEL ON CKD 3 Additional signs and symptoms: DM II Relevant Medical/Surgical History: recent left hip fracture 2 days ago FINDINGS: Kidneys: The right kidney measures 10.6 cm in length and the left kidney measures 9.9 cm in length. Kidneys demonstrate normal cortical echogenicity. No evidence of hydronephrosis or intrarenal stones. Bladder: Under distended urinary bladder with wall thickness measuring 0.6 cm. The prostate appears enlarged and tents the urinary bladder. The prostate measures 4.4 x 5.4 x 5.2 cm. No hydronephrosis or renal calculi. Urinary bladder wall thickness measuring 0.6 cm, possibly due to under distension or chronic outlet obstruction. Recommend correlation with urinalysis to exclude acute cystitis. XR CHEST PORTABLE    Result Date: 2/24/2022  EXAMINATION: ONE XRAY VIEW OF THE CHEST 2/24/2022 10:09 am COMPARISON: None.  HISTORY: ORDERING SYSTEM PROVIDED HISTORY: hip fracture TECHNOLOGIST PROVIDED HISTORY: Reason for exam:->hip fracture Reason for Exam: hip fracture FINDINGS: HEART/MEDIASTINUM: The cardiomediastinal silhouette is within normal limits. PLEURA/LUNGS: There are no focal consolidations or pleural effusions. There is no appreciable pneumothorax. BONES/SOFT TISSUE: No acute abnormality. No radiographic evidence of acute pulmonary disease. XR HIP 2-3 VW W PELVIS LEFT    Result Date: 2/24/2022  EXAMINATION: 4 XRAY VIEWS OF THE LEFT FEMUR; ONE XRAY VIEW OF THE PELVIS AND TWO XRAY VIEWS LEFT HIP 2/24/2022 9:10 am COMPARISON: None. HISTORY: ORDERING SYSTEM PROVIDED HISTORY: fall TECHNOLOGIST PROVIDED HISTORY: Reason for exam:->fall; ORDERING SYSTEM PROVIDED HISTORY: injury TECHNOLOGIST PROVIDED HISTORY: Reason for exam:->injury FINDINGS: There is an acute minimally displaced left subcapital femoral fracture with mild-to-moderate varus angulation. The bones are slightly demineralized. There is no dislocation. There are no bony destructive lesions. There is mild bilateral hip osteoarthritis. Vascular calcifications are noted. 1. Acute left femoral neck fracture.

## 2022-02-27 NOTE — PROGRESS NOTES
Discharge instructions gone over, all questions answered. IV removed, no complications.  Patient taken out via wheelchair for discharge

## 2022-02-28 ENCOUNTER — CARE COORDINATION (OUTPATIENT)
Dept: CASE MANAGEMENT | Age: 72
End: 2022-02-28

## 2022-02-28 ENCOUNTER — TELEPHONE (OUTPATIENT)
Dept: ORTHOPEDIC SURGERY | Age: 72
End: 2022-02-28

## 2022-02-28 NOTE — TELEPHONE ENCOUNTER
SANDRITA ANDREWS/ HOME CARE CALLED, DR. Junaid Toure DID SURGERY WHILE ON CALL AT [facility].  SHE NEEDS TO KNOW IF HE WILL FOLLOW THE PATIENT THRU HOME CARE AND WRITE THE ORDERS. PLEASE ADVISE.

## 2022-02-28 NOTE — CARE COORDINATION
[address] [facility] Transitions Initial Follow Up Call    Call within 2 business days of discharge: Yes    Patient: Tia Escalante Patient : 1950   MRN: <[id number]>  Reason for Admission: hip fx  Discharge Date: 22 RARS: Readmission Risk Score: 16.1 ( )      Last Discharge [facility]       Complaint Diagnosis Description Type Department Provider    22 Fall S/P hip replacement, left . .. ED to Hosp-Admission (Discharged) (ADMITTED) DAVE 4T Cesar Mcnulty MD           Attempted to reach pt for follow up call. Left message requesting call back. Care Transitions 24 Hour Call    Care Transitions Interventions         Follow Up  No future appointments.     Lakia Duncan

## 2022-03-01 ENCOUNTER — CARE COORDINATION (OUTPATIENT)
Dept: CASE MANAGEMENT | Age: 72
End: 2022-03-01

## 2022-03-01 NOTE — CARE COORDINATION
[address] Transitions Initial Follow Up Call    Call within 2 business days of discharge: Yes    Patient: Tia Escalante Patient : 1950   MRN: <[id number]>  Reason for Admission: hip fx, CKD, HTN, DM2  Discharge Date: 22 RARS: Readmission Risk Score: 16.1 ( )      Last Discharge [facility]       Complaint Diagnosis Description Type Department Provider    22 Fall S/P hip replacement, left . .. ED to Hosp-Admission (Discharged) (ADMITTED) [facility] Cesar Mcnulty MD           Spoke with: NA    Facility: [facility]     Second and final attempt to reach patient via phone for initial post hospital transition call. VM left stating purpose of call along with my contact information requesting a return call. Episode ended d/t unsuccessful contact. Upon chart review, HC is coordinating with MDs for patient care. Received inbound call and VM from patient who states he would like to speak with CTN or LPN CC. States he has some concerns about HC and his recovery. LPN CC left detailed message [facility] and f/u information for [facility] and LPN CC contact information.    Ondina Ariza  [facility]  Care Transitions  [phone]    Care Transitions 24 Hour Call    Care Transitions Interventions         Follow Up  Future Appointments   Date Time Provider Atul Lam   3/14/2022  9:30 AM MD ANDRE Al NASO ARLEY AFL NASO   3/14/2022  1:15 PM MD TAMIKA [facility] YORDY Ariza LPN

## 2022-03-02 ENCOUNTER — TELEPHONE (OUTPATIENT)
Dept: ORTHOPEDIC SURGERY | Age: 72
End: 2022-03-02

## 2022-03-02 NOTE — TELEPHONE ENCOUNTER
Oly Vasquez from [facility] need verbal for OT and PT. Also need instructions for dressing and showering.  pls call [phone]

## 2022-03-03 NOTE — TELEPHONE ENCOUNTER
S/w Brad Brody RN w/ [facility]. 1st post op 3/3/2022. Approval for PT. Shower at day 7. Dressings at day 14. See nephrology note: recommends no strong opiods  Per Brad Brody, pain is not well controlled. Has instructed patient to use ice.

## 2022-03-07 ENCOUNTER — OFFICE VISIT (OUTPATIENT)
Dept: ORTHOPEDIC SURGERY | Age: 72
End: 2022-03-07

## 2022-03-07 VITALS — BODY MASS INDEX: 26.37 KG/M2 | WEIGHT: 174 LBS | HEIGHT: 68 IN

## 2022-03-07 DIAGNOSIS — Z96.642 S/P TOTAL LEFT HIP ARTHROPLASTY: Primary | ICD-10-CM

## 2022-03-07 PROCEDURE — 99024 POSTOP FOLLOW-UP VISIT: CPT | Performed by: ORTHOPAEDIC SURGERY

## 2022-03-07 RX ORDER — HYDROCODONE BITARTRATE AND ACETAMINOPHEN 5; 325 MG/1; MG/1
1 TABLET ORAL EVERY 6 HOURS PRN
Qty: 12 TABLET | Refills: 0 | Status: SHIPPED | OUTPATIENT
Start: 2022-03-07 | End: 2022-03-10

## 2022-03-07 NOTE — PROGRESS NOTES
History of Present Illness:  Marlen Lopes is a pleasant 80 y.o. male who presents for a post operative visit. He is 10 days out following a left KAREEM, DAA for femoral neck fracture. Overall He is doing okay and feels that their pain is well controlled with current pain medications. He has been compliant with the weight bearing instructions and anterior precautions. [facility] Has out patient Nephrology f/up for CKD and post op anemia    He denies fevers, chills, numbness, tingling, and shortness of breath. . Denies any setbacks. Accompanied by wife. Medical History:  Patient's medications, allergies, past medical, surgical, social and family histories were reviewed and updated as appropriate. No notes on file    Review of Systems  A 14 point review of systems was completed by the patient and is available in the media section of the scanned medical record and was reviewed on 3/7/2022. Vital Signs: There were no vitals filed for this visit. General/Appearance: Alert and oriented and in no apparent distress. Skin:  There are no skin lesions, cellulitis, or extreme edema. The patient has warm and well-perfused Bilateral lower  extremities with brisk capillary refill. Hip  Exam: left    Inspection: Hip incision(s)  are clean, dry and intact and well approximated. The Prineo/Dermabond dressing was removed by myself. Mild ecchymosis and swelling are present as can be expected. There is no erythema, drainage or other signs of infection    Palpation:  No crepitus to gentle motion / circumduction of the hip    Active Range of Motion: Deferred    Passive Range of Motion: 0 to 90 deg    Strength:  Deferred    Special Tests:  Deferred.     Neurovascular: Sensation to light touch is intact, no motor deficits, palpable radial pulses 2+    Radiology:     Plain radiographs of the left hip comprising 2 views (AP Pelvis, Love View and False Profile view of the left hip) were obtained and reviewed in the office: Shows postsurgical changes from the left hip arthroplasty. No evidence of acute fracture or complications. Impression: Stable postop x-ray. Assessment :  Mr. Ning Rosales is a pleasant 80 y.o. patient who is 10 days out following a left KAREEM, DAA for femoral neck fracture. No acute surgical concerns, however, he has pallor and I am concerned about acute on chronic anemia. Impression:  Encounter Diagnosis   Name Primary?  S/P total left hip arthroplasty Yes       Office Procedures:  Orders Placed This Encounter   Procedures    Ambulatory referral to Physical Therapy     Referral Priority:   Routine     Referral Type:   Eval and Treat     Referral Reason:   Specialty Services Required     Requested Specialty:   Physical Therapy     Number of Visits Requested:   1       Treatment Plan:    Overall Ning Rosales is doing well. The pain is well-controlled. We recommend that He commence with physical therapy for timeline based progression of motion, weight bearing, and and strengthening. Patient is WBAT, and is to maintain anterior hip precautions for 6 weeks. I recommend he follow-up with his nephrologist as outpatient for kidney function and hemoglobin monitoring. The patient was told that he is restricted from driving for at least 2weeks postop. They were advised to continue their NSAIDs, and Torey-Hose compression stockings for 14 days post surgery, and ASA x 28 days post surgery. All of his  questions were fully answered today. We would like to see Ning Rosales back in 1 month  follow-up visit and new hip xrays (AP pelvis and left boo view). Sincerely,    Rosa Guzmán MD [address] [place] [address], [id number]  Email: [email or web address]  Office: [phone]    03/07/22  3:36 PM

## 2022-03-07 NOTE — LETTER
Physical Therapy Rehabilitation Referral    Patient Name: Pam Patel MRN: [id number]  DOS: 3/7/2022     Diagnosis:   1.  S/P total left hip arthroplasty        Precautions:     [x] Evaluate and Treat    Post Op Instructions:  [] 20 lb flat foot weight bearing x 3 weeks with crutches  [] 20 lb flat foot weight bearing x 4 weeks with crutches (cartilage repair protocol)  [x] Weight bearing as tolerated x 2 weeks with crutches  [] No active abduction x 6 weeks (abductor repair protocol)  [] Continuous passive motion (CPM)   [] AAROM: Flexion to 90   [] Uni-planar passive range of motion   [] AAROM: External rotation to 10   [] Hip brace on at all times    [] AAROM: Extension to 10   [] Hip brace when hip at risk     [] AAROM:  Neutral IR   [] Home exercise program (copy to patient)   [] AAROM: Abduction to 25    [] Isometric external rotator strengthening    [] Isometric glute max strengthening     [] Isometric abductor strengthening     [] Leg Circumduction (PT and family member assisted x 3 weeks)                 Post-op Phases:  []Phase I: Maximum Protection (Day 1-3 Weeks)  []Phase II: Mobility & Neuromuscular Retraining (3-6 Weeks)  []Phase III: Phase III: Muscle Balance and Strengthening (6-12 Weeks)  []Phase IV: Functional Training of the Hip & Lower Extremity (12-18 Weeks)  []Phase V: Advanced Training  Specificity for Return to Sport and/or Work (18-24 [facility])    Non-Operative & Pre-Operative Rehabilitation:    Cardiovascular:            Deep Hip Rotators  [] Upright Bike (Baseline)           [] External Rotators AROM in 4PK (Baseline)  [] Walking (Progression 1)           [] Internal Rotators AROM in 4PK (Baseline)  [] Running (Progression 2)           [] ER in side lying (Progression 1)  [] Dynamic Loading (Progression 3)          [] IR in side lying (Progression 1)                [] ER with resistance in 4PK (Progression 2)                [] IR with resistance in 4PK (Progression 2)                 [] Lateral Step Up (Progression 3)    Positioning:  [] 4PK with pelvic tilts (Baseline)  [] Pelvic tilts in sitting (Progression 1)      Core:   [] Relaxation Breathing (Baseline)         [] [place] lying elbow presses (Progression 1)  [] Side Planks (Baseline)         [] Side planks + hip abduction (Progression 1)  [] Bird-Dog (Baseline)            [] Bird-Dog with resistance (Progression 1)    Glute Complex:            Load Transfer:  [] Bridging (Baseline)            [] Weight Shifting (Baseline)     [] Single Leg Bridge (Progression 1)        [] Single Leg Stance to SEBT(Progression 1)     [] Single Leg Lower (Progression 2)         [] Hip hinge + monster walks (Progression 2)       Mobility:  [] Juan position with breathing (baseline)  [] Hip Hinge with stick & neutral spine (baseline)  [] Sit to stand (baseline)  [] Hip Hinge without guidance (Progression 1)  [] Hip Hinge with resisted punch out guidance (Progression 2)      Pelvic Floor:  [] Relaxation breathing         Activities:       [] Rowing       [] Stepper/Exercise bike     [] Swimming  [] Water exercises    Modalities:     Return to Sport:  [x] Of Choice      [] Plyometrics  [] Ultrasound     [] Rhythmic stabilization  [] Iontophoresis    [] Core strengthening   [] Moist heat     [] Sports specific program:   [] Massage         [x] Cryotherapy      [] Electrical stimulation     [] Paraffin  [] Whirlpool  [] TENS    [x] Home exercise program (copy to patient).         Perform exercises for:   15     minutes    3      times/day  [x] Supervised physical therapy  Frequency: []  1x week  [x] 2x week  [] 3x week  [] Other:   Duration: [] 2 weeks   [] 4 weeks  [x] 6 weeks  [] Other:     Additional Instructions:   USE ANTERIOR PROTOCOLS  WB AS TOLERABLE      Sincerely,    Vero Jonas  [address] [facility] [address] [place] [address], [address], [address]  Email: [email or web address]  Office: [phone]    03/07/22  3:06 PM

## 2022-03-11 ENCOUNTER — HOSPITAL ENCOUNTER (OUTPATIENT)
Age: 72
Discharge: HOME | End: 2022-03-11
Payer: COMMERCIAL

## 2022-03-11 DIAGNOSIS — D63.1 ANEMIA IN CHRONIC KIDNEY DISEASE, UNSPECIFIED CKD STAGE: ICD-10-CM

## 2022-03-11 DIAGNOSIS — N18.9 ANEMIA IN CHRONIC KIDNEY DISEASE, UNSPECIFIED CKD STAGE: ICD-10-CM

## 2022-03-11 LAB
ALBUMIN SERPL-MCNC: 4.2 G/DL (ref 3.4–5)
ANION GAP SERPL CALCULATED.3IONS-SCNC: 19 MMOL/L (ref 3–16)
BILIRUBIN URINE: NEGATIVE
BLOOD, URINE: NEGATIVE
BUN BLDV-MCNC: 22 MG/DL (ref 7–20)
CALCIUM SERPL-MCNC: 9.4 MG/DL (ref 8.3–10.6)
CHLORIDE BLD-SCNC: 107 MMOL/L (ref 99–110)
CLARITY: CLEAR
CO2: 20 MMOL/L (ref 21–32)
COLOR: YELLOW
CREAT SERPL-MCNC: 1.9 MG/DL (ref 0.8–1.3)
CREATININE URINE: 215.6 MG/DL (ref 39–259)
EPITHELIAL CELLS, UA: 1 /HPF (ref 0–5)
GFR AFRICAN AMERICAN: 43
GFR NON-AFRICAN AMERICAN: 35
GLUCOSE BLD-MCNC: 90 MG/DL (ref 70–99)
GLUCOSE URINE: NEGATIVE MG/DL
HCT VFR BLD CALC: 29.5 % (ref 40.5–52.5)
HEMOGLOBIN: 9.9 G/DL (ref 13.5–17.5)
HYALINE CASTS: 2 /LPF (ref 0–8)
KETONES, URINE: ABNORMAL MG/DL
LEUKOCYTE ESTERASE, URINE: NEGATIVE
MCH RBC QN AUTO: 31.1 PG (ref 26–34)
MCHC RBC AUTO-ENTMCNC: 33.7 G/DL (ref 31–36)
MCV RBC AUTO: 92.4 FL (ref 80–100)
MICROALBUMIN UR-MCNC: 80.2 MG/DL
MICROALBUMIN/CREAT UR-RTO: 372 MG/G (ref 0–30)
MICROSCOPIC EXAMINATION: YES
NITRITE, URINE: NEGATIVE
PDW BLD-RTO: 13 % (ref 12.4–15.4)
PH UA: 5 (ref 5–8)
PHOSPHORUS: 3.8 MG/DL (ref 2.5–4.9)
PLATELET # BLD: 470 K/UL (ref 135–450)
PMV BLD AUTO: 7.7 FL (ref 5–10.5)
POTASSIUM SERPL-SCNC: 4.2 MMOL/L (ref 3.5–5.1)
PROTEIN PROTEIN: 151 MG/DL
PROTEIN UA: 100 MG/DL
RBC # BLD: 3.19 M/UL (ref 4.2–5.9)
RBC UA: 6 /HPF (ref 0–4)
SODIUM BLD-SCNC: 146 MMOL/L (ref 136–145)
SPECIFIC GRAVITY UA: >=1.03 (ref 1–1.03)
URINE TYPE: ABNORMAL
UROBILINOGEN, URINE: 0.2 E.U./DL
WBC # BLD: 11.4 K/UL (ref 4–11)
WBC UA: 3 /HPF (ref 0–5)

## 2022-03-11 PROCEDURE — 85027 COMPLETE CBC AUTOMATED: CPT

## 2022-03-11 PROCEDURE — 82043 UR ALBUMIN QUANTITATIVE: CPT

## 2022-03-11 PROCEDURE — 81001 URINALYSIS AUTO W/SCOPE: CPT

## 2022-03-11 PROCEDURE — 84156 ASSAY OF PROTEIN URINE: CPT

## 2022-03-11 PROCEDURE — 82570 ASSAY OF URINE CREATININE: CPT

## 2022-03-11 PROCEDURE — 80069 RENAL FUNCTION PANEL: CPT

## 2022-03-11 PROCEDURE — 36415 COLL VENOUS BLD VENIPUNCTURE: CPT

## 2022-03-16 ENCOUNTER — TELEPHONE (OUTPATIENT)
Dept: ORTHOPEDIC SURGERY | Age: 72
End: 2022-03-16

## 2022-03-16 NOTE — TELEPHONE ENCOUNTER
Medical Facility Question     Facility Name: [address]  Contact Name: Jonny Jessy Number: [phone]  Request or Information: ADVISED THE PATIENT IS NOT CALLING FOR PHYSICAL THERAPY [facility]

## 2022-03-17 NOTE — TELEPHONE ENCOUNTER
S/w Kasandra Stein at [facility]. Calling to notify he has been d/c from home care.  Patient was advised to call to set up out patient PT.

## 2022-03-28 ENCOUNTER — HOSPITAL ENCOUNTER (OUTPATIENT)
Dept: PHYSICAL THERAPY | Age: 72
Setting detail: THERAPIES SERIES
Discharge: HOME | End: 2022-03-28
Payer: COMMERCIAL

## 2022-03-28 PROCEDURE — 97161 PT EVAL LOW COMPLEX 20 MIN: CPT

## 2022-03-28 PROCEDURE — 97110 THERAPEUTIC EXERCISES: CPT

## 2022-03-28 PROCEDURE — 97530 THERAPEUTIC ACTIVITIES: CPT

## 2022-03-28 NOTE — PLAN OF CARE
Rudyderekfatoumata, [facility], [address]  Phone: [phone]   Fax: [phone]                                                     Physical Therapy Certification    Dear Referring Practitioner: Dr. Sin Stovall,    We had the pleasure of evaluating the following patient for physical therapy services at [address] [place] [address]. A summary of our findings can be found in the initial assessment below. This includes our plan of care. If you have any questions or concerns regarding these findings, please do not hesitate to contact me at the office phone number checked above. Thank you for the referral.       Physician Signature:_______________________________Date:__________________  By signing above (or electronic signature), therapists plan is approved by physician      Patient: Paco Bach   : 1950   MRN: [id number]  Referring Physician: Referring Practitioner: Dr. Sin Stovall      Evaluation Date: 3/28/2022      Medical Diagnosis Information:  Diagnosis: H62.789 (ICD-10-CM) - S/P total left hip arthroplasty   Treatment Diagnosis: L hip weakness, decreased ROM, abnormal gait                                         Insurance information: PT Insurance Information: BCBS, no copay ot auth, $750 ded Covered at 90% once DED is met, visits BMN    Precautions/ Contra-indications: hip precautions  Latex Allergy:  [x]NO      []YES  Preferred Language for Healthcare:   [x]English       []Other:    C-SSRS Triggered by Intake questionnaire (Past 2 wk assessment ):   [x] No, Questionnaire did not trigger screening.   [] Yes, Patient intake triggered C-SSRS Screening     [] Completed, no further action required. [] Completed, PCP notified via Epic    SUBJECTIVE: Patient stated complaint: Pt tripped on stairs and broke L hip on . Pt is s/p T THR on 22. Pt had 2 sessions of home PT.  Pt has been using single conditions  []Prior Stroke (I69.30)  []Parkinson's (G20)  []Encephalopathy (G93.40)  []MS (Shannon Pemberton)  []Post-polio (G14)  []SCI  []TBI  []ALS Other conditions  []Fibromyalgia (M79.7)  []Vertigo  []Syncope  []Kidney Failure  []Cancer      []currently undergoing                treatment  []Pregnancy  []Incontinence   Prior surgeries  []involved limb  []previous spinal surgery  [] section birth  []hysterectomy  []bowel / bladder surgery  []other relevant surgeries   []Other:                Barriers to/and or personal factors that will affect rehab potential:              []Age  []Sex    []Smoker              []Motivation/Lack of Motivation                        []Co-Morbidities              []Cognitive Function, education/learning barriers              []Environmental, home barriers              []profession/work barriers  []past PT/medical experience  [x]other:none  Justification:      Falls Risk Assessment (30 days):   [x] Falls Risk assessed and no intervention required. [] Falls Risk assessed and Patient requires intervention due to being higher risk   TUG score (>12s at risk):     [] Falls education provided, including         ASSESSMENT: 81 y/o male presents s/p L THR on 22 secondary to fall on stairs on 22. Pt presents with decreased hip ROM, hip pain, hip weakness, abnormal gait, and impaired balance. Pt is limited in ability to perform sit-stand transfers and toileting, ambulate community distances, ascend/descend stairs with reciprocal pattern, and dress without restrictions.         Functional Impairments:     [x]Noted lumbar/proximal hip/LE joint hypomobility   [x]Decreased LE functional ROM   [x]Decreased core/proximal hip strength and neuromuscular control   [x]Decreased LE functional strength   [x]Reduced balance/proprioceptive control   []other:      Functional Activity Limitations (from functional questionnaire and intake)   [x]Reduced ability to tolerate prolonged functional positions   [x]Reduced ability or difficulty with changes of positions or transfers between positions   []Reduced ability to maintain good posture and demonstrate good body mechanics with sitting, bending, and lifting   []Reduced ability to sleep   [] Reduced ability or tolerance with driving and/or computer work   [x]Reduced ability to perform lifting, carrying tasks   [x]Reduced ability to squat   [x]Reduced ability to forward bend   [x]Reduced ability to ambulate prolonged functional periods/distances/surfaces   [x]Reduced ability to ascend/descend stairs   [x]Reduced ability to run, hop, cut or jump   []other:    Participation Restrictions   []Reduced participation in self care activities   [x]Reduced participation in home management activities   [x]Reduced participation in work activities   [x]Reduced participation in social activities. [x]Reduced participation in sport/recreation activities. Classification :    [x]Signs/symptoms consistent with post-surgical status including decreased ROM, strength and function.    []Signs/symptoms consistent with joint sprain/strain   []Signs/symptoms consistent with patella-femoral syndrome   []Signs/symptoms consistent with knee OA/hip OA   []Signs/symptoms consistent with internal derangement of knee/Hip   []Signs/symptoms consistent with functional hip weakness/NMR control      []Signs/symptoms consistent with tendinitis/tendinosis    []signs/symptoms consistent with pathology which may benefit from Dry needling      []other:      Prognosis/Rehab Potential:      []Excellent   [x]Good    []Fair   []Poor    Tolerance of evaluation/treatment:    []Excellent   [x]Good    []Fair   []Poor    Physical Therapy Evaluation Complexity Justification  [x] A history of present problem with:  [x] no personal factors and/or comorbidities that impact the plan of care;  []1-2 personal factors and/or comorbidities that impact the plan of care  []3 personal factors and/or comorbidities that impact the plan of care  [x] An examination of body systems using standardized tests and measures addressing any of the following: body structures and functions (impairments), activity limitations, and/or participation restrictions;:  [] a total of 1-2 or more elements   [] a total of 3 or more elements   [x] a total of 4 or more elements   [x] A clinical presentation with:  [x] stable and/or uncomplicated characteristics   [] evolving clinical presentation with changing characteristics  [] unstable and unpredictable characteristics;   [x] Clinical decision making of [x] low, [] moderate, [] high complexity using standardized patient assessment instrument and/or measurable assessment of functional outcome. [x] EVAL (LOW) 59135 (typically 15 minutes face-to-face)  [] EVAL (MOD) 19179 (typically 30 minutes face-to-face)  [] EVAL (HIGH) 02440 (typically 45 minutes face-to-face)  [] RE-EVAL     PLAN:  Frequency/Duration:  2 days per week for 8 Weeks:  INTERVENTIONS:  [x] Therapeutic exercise including: strength training, ROM, for Upper extremity and core   [x]  NMR activation and proprioception for UE, scap and Core     GOALS:  Patient stated goal: improving mobility  [] Progressing: [] Met: [] Not Met: [] Adjusted    Therapist goals for Patient:   Short Term Goals: To be achieved in: 4 weeks  1. Independent in HEP and progression per patient tolerance, in order to prevent re-injury. [] Progressing: [] Met: [] Not Met: [] Adjusted  2. Patient will have a decrease in pain to 0/10 with sit-stand from toilet to improve ability to perform toileting at [facility]. [] Progressing: [] Met: [] Not Met: [] Adjusted  3. Patient will demo normalized gait pattern without AD or Trendelenburg. [] Progressing: [] Met: [] Not Met: [] Adjusted  4. Patient will increased L quad strength as demonstrated by reciprocal pattern on stairs without hand rail to return to PLOF with mobility within home.   [] Progressing: [] Met: [] Not Met: [] Adjusted    Long Term Goals: To be achieved in: 8 weeks  1. FOTO score of at least 75 to assist with reaching prior level of function with ADLs. [] Progressing: [] Met: [] Not Met: [] Adjusted  2. Patient will demonstrate increased hip flex AROM to 110 or higher to allow for improved ability to squat to ground to  shoes. [] Progressing: [] Met: [] Not Met: [] Adjusted  3. Patient will demonstrate an increase in global hip Strength to at least 4/5 to improve functional LE strength for improved ability to ambulate community distances. [] Progressing: [] Met: [] Not Met: [] Adjusted  4. Patient will demo 10 or more reps on 30 sec STS test to indicate improved functional LE strength. [] Progressing: [] Met: [] Not Met: [] Adjusted  5. Pt will score 520meters or higher on 6MWT for age appropriate community ambulation. [] Progressing: [] Met: [] Not Met: [] Adjusted  [x] Manual therapy as indicated for shoulder, scapula and spine to include: Dry Needling/IASTM, STM, PROM, Gr I-IV mobilizations, manipulation. [x] Modalities as needed that may include: thermal agents, E-stim, Biofeedback, US, iontophoresis as indicated  [x] Patient education on joint protection, postural re-education, activity modification, progression of HEP. HEP instruction: Pt provided with written HEP instructions. Electronically signed by:   Bernardino Fiore, PT, DPT, ATC

## 2022-03-28 NOTE — FLOWSHEET NOTE
[facility]  Phone: [phone]   Fax: [phone]    Physical Therapy Treatment Note/ Progress Report:     Date:  3/28/2022    Patient Name:  Uzma Edmonds    :  1950  MRN: [id number]  Restrictions/Precautions:    Medical/Treatment Diagnosis Information:  · Diagnosis: E47.856 (ICD-10-CM) - S/P total left hip arthroplasty  · Treatment Diagnosis: L hip weakness, decreased ROM, abnormal gait  Insurance/Certification information:  PT Insurance Information: BCBS, no copay ot auth, $750 ded Covered at 90% once DED is met, visits BMN  Physician Information:  Referring Practitioner: Dr. Lily Rivera of care signed (Y/N): []  Yes [x]  No     Date of Patient follow up with Physician:      Progress Report: []  Yes  [x]  No     Date Range for reporting period:  Beginning: 3/28/22  Ending:     Progress report due (10 Rx/or 30 days whichever is less): visit #10 or  (date)     Recertification due (POC duration/ or 90 days whichever is less): visit #16 or 22 (date)     Visit # Insurance Allowable Auth required?  Date Range   1 BMN []  Yes  [x]  No          Latex Allergy:  [x]NO      []YES  Preferred Language for Healthcare:   [x]English       []other:    Functional Scale:           Date assessed:  FOTO physical FS primary measure score = 53; risk adjusted = 47  3/28/22    Pain level:  0-2/10     SUBJECTIVE:  See eval    OBJECTIVE: See eval      RESTRICTIONS/PRECAUTIONS: s/p T THR on 22, anterior hip precautions    Exercises/Interventions:     Therapeutic Exercise (44510)  Resistance / level Sets/sec Reps Notes / Cues   Recumbent bike (if able to maintain hip precautions)       Standing hip ABD  2 10    Standing hip flex  2 10    bridges  5\" 10    Heel slides for hip flex AROM  10\" 10    Lateral walk       Supine HSS with strap       Heel raises                     Therapeutic Activities (23802)       Pt education on hip precautions, HEP, gait with AD, POC, expectations for rehab and prognosis 10 min      Shuttle squats       Tandem on airex       Stork stand UE assist 30\" 2B    Neuromuscular Re-ed (19999)                            Manual Intervention ([id number])       Knee mobs/PROM       Tib/Fem Mobs       Patella Mobs       Ankle mobs                         Modalities:     Pt. Education:  -pt educated on diagnosis, prognosis and expectations for rehab  -all pt questions were answered    Home Exercise Program:  Access Code: [email or web address]  URL: [employer]  Date: 03/28/2022  Prepared by: Amaya Sick    Exercises  Supine Heel Slide - 1 x daily - 7 x weekly - 3 sets - 10 reps  Standing Hip Abduction with Resistance at Ankles and Counter Support - 1 x daily - 7 x weekly - 3 sets - 10 reps  Single Leg Balance in March Position - 1 x daily - 7 x weekly - 3 sets - 30 sec hold  Standing Hip Flexion with Resistance Loop - 1 x daily - 7 x weekly - 3 sets - 10 reps  Supine Bridge - 1 x daily - 7 x weekly - 2 sets - 10 reps - 5 sec hold      Therapeutic Exercise and NMR EXR  [] (81947) Provided verbal/tactile cueing for activities related to strengthening, flexibility, endurance, ROM for improvements in LE, proximal hip, and core control with self care, mobility, lifting, ambulation.  [] (86450) Provided verbal/tactile cueing for activities related to improving balance, coordination, kinesthetic sense, posture, motor skill, proprioception  to assist with LE, proximal hip, and core control in self care, mobility, lifting, ambulation and eccentric single leg control.   [] (97667) Therapist is in constant attendance of 2 or more patients providing skilled therapy interventions, but not providing any significant amount of measurable one-on-one time to either patient, for improvements in LE, proximal hip, and core control in self care, mobility, lifting, ambulation and eccentric single leg control.      NMR and Therapeutic Activities:    [x] (31137 or 18657) Provided verbal/tactile cueing for activities related to improving balance, coordination, kinesthetic sense, posture, motor skill, proprioception and motor activation to allow for proper function of core, proximal hip and LE with self care and ADLs  [] (48883) Gait Re-education- Provided training and instruction to the patient for proper LE, core and proximal hip recruitment and positioning and eccentric body weight control with ambulation re-education including up and down stairs     Home Exercise Program:    [x] (68924) Reviewed/Progressed HEP activities related to strengthening, flexibility, endurance, ROM of core, proximal hip and LE for functional self-care, mobility, lifting and ambulation/stair navigation   [] (24382)Reviewed/Progressed HEP activities related to improving balance, coordination, kinesthetic sense, posture, motor skill, proprioception of core, proximal hip and LE for self care, mobility, lifting, and ambulation/stair navigation      Manual Treatments:  PROM / STM / Oscillations-Mobs:  G-I, II, III, IV (PA's, Inf., Post.)  [] (94302) Provided manual therapy to mobilize LE, proximal hip and/or LS spine soft tissue/joints for the purpose of modulating pain, promoting relaxation,  increasing ROM, reducing/eliminating soft tissue swelling/inflammation/restriction, improving soft tissue extensibility and allowing for proper ROM for normal function with self care, mobility, lifting and ambulation. Modalities:  [] (19462) Vasopneumatic compression: Utilized vasopneumatic compression to decrease edema / swelling for the purpose of improving mobility and quad tone / recruitment which will allow for increased overall function including but not limited to self-care, transfers, ambulation, and ascending / descending stairs.        Charges:  Timed Code Treatment Minutes: 30   Total Treatment Minutes: 45     [x] EVAL - LOW (67284)   [] EVAL - MOD (49955)  [] EVAL - HIGH (38681)  [] RE-EVAL (76567)  [x] WC(89432) x   1    [] Ionto  [] NMR (10780) x       [] Vaso  [] Manual (28919) x       [] Ultrasound  [x] TA x 1       [] Mech Traction (29055)  [] Aquatic Therapy x     [] ES (un) (52091):   [] Home Management Training x  [] ES(attended) (40003)   [] Dry Needling 1-2 muscles (49414):  [] Dry Needling 3+ muscles (131670  [] Group:      [] Other:     GOALS:  Patient stated goal: improving mobility  [] Progressing: [] Met: [] Not Met: [] Adjusted    Therapist goals for Patient:   Short Term Goals: To be achieved in: 4 weeks  1. Independent in HEP and progression per patient tolerance, in order to prevent re-injury. [] Progressing: [] Met: [] Not Met: [] Adjusted  2. Patient will have a decrease in pain to 0/10 with sit-stand from toilet to improve ability to perform toileting at [facility]. [] Progressing: [] Met: [] Not Met: [] Adjusted  3. Patient will demo normalized gait pattern without AD or Trendelenburg. [] Progressing: [] Met: [] Not Met: [] Adjusted  4. Patient will increased L quad strength as demonstrated by reciprocal pattern on stairs without hand rail to return to PLOF with mobility within home. [] Progressing: [] Met: [] Not Met: [] Adjusted    Long Term Goals: To be achieved in: 8 weeks  1. FOTO score of at least 75 to assist with reaching prior level of function with ADLs. [] Progressing: [] Met: [] Not Met: [] Adjusted  2. Patient will demonstrate increased hip flex AROM to 110 or higher to allow for improved ability to squat to ground to  shoes. [] Progressing: [] Met: [] Not Met: [] Adjusted  3. Patient will demonstrate an increase in global hip Strength to at least 4/5 to improve functional LE strength for improved ability to ambulate community distances. [] Progressing: [] Met: [] Not Met: [] Adjusted  4. Patient will demo 10 or more reps on 30 sec STS test to indicate improved functional LE strength. [] Progressing: [] Met: [] Not Met: [] Adjusted  5. Pt will score 520meters or higher on 6MWT for age appropriate community ambulation. [] Progressing: [] Met: [] Not Met: [] Adjusted    Overall Progression Towards Functional goals/ Treatment Progress Update:  [] Patient is progressing as expected towards functional goals listed. [] Progression is slowed due to complexities/Impairments listed. [] Progression has been slowed due to co-morbidities. [x] Plan just implemented, too soon to assess goals progression <30days   [] Goals require adjustment due to lack of progress  [] Patient is not progressing as expected and requires additional follow up with physician  [] Other    Persisting Functional Limitations/Impairments:  []Sitting []Standing   []Walking []Stairs   []Transfers []ADLs   []Squatting/bending []Kneeling  []Housework []Job related tasks  []Driving []Sports/Recreation   []Sleeping []Other:    ASSESSMENT:  See eval  Treatment/Activity Tolerance:  [x] Pt able to complete treatment [] Patient limited by fatique  [] Patient limited by pain  [] Patient limited by other medical complications  [] Other:     Prognosis:  [x] Good [] Fair  [] Poor    Patient Requires Follow-up: [x] Yes  [] No    Return to Play:    [x]  N/A   []  Stage 1: Intro to Strength   []  Stage 2: Return to Run and Strength   []  Stage 3: Return to Jump and Strength   []  Stage 4: Dynamic Strength and Agility   []  Stage 5: Sport Specific Training     []  Ready to Return to Play, Meets All Above Stages   []  Not Ready for Return to Sports   Comments:            PLAN: See eval. PT 2x / week for 8 weeks. [] Continue per plan of care [] Alter current plan (see comments)  [x] Plan of care initiated [] Hold pending MD visit [] Discharge    Electronically signed by: Anamika Benedict, PT, DPT, ATC      Note: If patient does not return for scheduled/ recommended follow up visits, this note will serve as a discharge from care along with most recent update on progress.

## 2022-04-01 ENCOUNTER — HOSPITAL ENCOUNTER (OUTPATIENT)
Dept: PHYSICAL THERAPY | Age: 72
Setting detail: THERAPIES SERIES
Discharge: HOME | End: 2022-04-01
Payer: COMMERCIAL

## 2022-04-01 PROCEDURE — 97110 THERAPEUTIC EXERCISES: CPT

## 2022-04-01 PROCEDURE — 97530 THERAPEUTIC ACTIVITIES: CPT

## 2022-04-01 NOTE — FLOWSHEET NOTE
[facility]  Phone: [phone]   Fax: [phone]    Physical Therapy Treatment Note/ Progress Report:     Date:  2022    Patient Name:  Olive Sullivan    :  1950  MRN: [id number]  Restrictions/Precautions:    Medical/Treatment Diagnosis Information:  · Diagnosis: K40.591 (ICD-10-CM) - S/P total left hip arthroplasty  · Treatment Diagnosis: L hip weakness, decreased ROM, abnormal gait  Insurance/Certification information:  PT Insurance Information: BCBS, no copay ot auth, $750 ded Covered at 90% once DED is met, visits BMN  Physician Information:  Referring Practitioner: Dr. Kailey Rico of care signed (Y/N): []  Yes [x]  No     Date of Patient follow up with Physician:      Progress Report: []  Yes  [x]  No     Date Range for reporting period:  Beginning: 3/28/22  Ending:     Progress report due (10 Rx/or 30 days whichever is less): visit #10 or 47 (date)     Recertification due (POC duration/ or 90 days whichever is less): visit #16 or 22 (date)     Visit # Insurance Allowable Auth required? Date Range   2 BMN []  Yes  [x]  No          Latex Allergy:  [x]NO      []YES  Preferred Language for Healthcare:   [x]English       []other:    Functional Scale:           Date assessed:  FOTO physical FS primary measure score = 53; risk adjusted = 47  3/28/22    Pain level:  3/10     SUBJECTIVE:  Pt reports pain with getting in/out car and hip flex. Pt notes pain improves throughout day. Pt has not been able to do HEP d/t traveling for work.     OBJECTIVE:    gait without AD but + Trendelenburg and decreased stance time on L LE.  RESTRICTIONS/PRECAUTIONS: s/p T THR on 22, anterior hip precautions    Exercises/Interventions:     Therapeutic Exercise (88472)  Resistance / level Sets/sec Reps Notes / Cues   Recumbent bike (if able to maintain hip precautions)  5min     Standing hip ABD  2 10B    Standing hip flex  2/5\" 10 L only    bridges  5\" 10 Green strap at ankles to inc ab activation   SB curls hip flex AROM  10\" 10    Lateral walk       Supine HSS with strap  30\" 3    Heel raises  3 10                  Therapeutic Activities (12714)            Shuttle squats  3 10    Tandem on airex  30\" 3 Surgical leg forward   Lateral step up (up and over) 4in 1 10    Step forward        Stork stand UE assist 30\" 3B    Neuromuscular Re-ed (23555)                            Manual Intervention (05955)       Knee mobs/PROM       Tib/Fem Mobs       Patella Mobs       Ankle mobs                         Modalities:     Pt. Education:  -pt educated on diagnosis, prognosis and expectations for rehab  -all pt questions were answered    Home Exercise Program:  Access Code: [email or web address]  URL: [email or web address]  Date: 03/28/2022  Prepared by:  Taras Trejo    Exercises  Supine Heel Slide - 1 x daily - 7 x weekly - 3 sets - 10 reps  Standing Hip Abduction with Resistance at Ankles and Counter Support - 1 x daily - 7 x weekly - 3 sets - 10 reps  Single Leg Balance in March Position - 1 x daily - 7 x weekly - 3 sets - 30 sec hold  Standing Hip Flexion with Resistance Loop - 1 x daily - 7 x weekly - 3 sets - 10 reps  Supine Bridge - 1 x daily - 7 x weekly - 2 sets - 10 reps - 5 sec hold      Therapeutic Exercise and NMR EXR  [x] (03684) Provided verbal/tactile cueing for activities related to strengthening, flexibility, endurance, ROM for improvements in LE, proximal hip, and core control with self care, mobility, lifting, ambulation.  [] (34025) Provided verbal/tactile cueing for activities related to improving balance, coordination, kinesthetic sense, posture, motor skill, proprioception  to assist with LE, proximal hip, and core control in self care, mobility, lifting, ambulation and eccentric single leg control.   [] (01573) Therapist is in constant attendance of 2 or more patients providing skilled therapy interventions, but not providing any significant amount of measurable one-on-one time to either patient, for improvements in LE, proximal hip, and core control in self care, mobility, lifting, ambulation and eccentric single leg control. NMR and Therapeutic Activities:    [x] (27637 or 13215) Provided verbal/tactile cueing for activities related to improving balance, coordination, kinesthetic sense, posture, motor skill, proprioception and motor activation to allow for proper function of core, proximal hip and LE with self care and ADLs  [] (40164) Gait Re-education- Provided training and instruction to the patient for proper LE, core and proximal hip recruitment and positioning and eccentric body weight control with ambulation re-education including up and down stairs     Home Exercise Program:    [] (68415) Reviewed/Progressed HEP activities related to strengthening, flexibility, endurance, ROM of core, proximal hip and LE for functional self-care, mobility, lifting and ambulation/stair navigation   [] (23015)Reviewed/Progressed HEP activities related to improving balance, coordination, kinesthetic sense, posture, motor skill, proprioception of core, proximal hip and LE for self care, mobility, lifting, and ambulation/stair navigation      Manual Treatments:  PROM / STM / Oscillations-Mobs:  G-I, II, III, IV (PA's, Inf., Post.)  [] (91404) Provided manual therapy to mobilize LE, proximal hip and/or LS spine soft tissue/joints for the purpose of modulating pain, promoting relaxation,  increasing ROM, reducing/eliminating soft tissue swelling/inflammation/restriction, improving soft tissue extensibility and allowing for proper ROM for normal function with self care, mobility, lifting and ambulation.      Modalities:  [] (05673) Vasopneumatic compression: Utilized vasopneumatic compression to decrease edema / swelling for the purpose of improving mobility and quad tone / recruitment which will allow for increased overall function including but not limited to self-care, transfers, ambulation, and ascending / descending stairs. Charges:  Timed Code Treatment Minutes: 40   Total Treatment Minutes: 40     [] EVAL - LOW (10665)   [] EVAL - MOD (74311)  [] EVAL - HIGH (84849)  [] RE-EVAL (83317)  [x] ZH(86682) x   2   [] Ionto  [] NMR (36683) x       [] Vaso  [] Manual (50306) x       [] Ultrasound  [x] TA x 1       [] Mech Traction (58168)  [] Aquatic Therapy x     [] ES (un) (33505):   [] Home Management Training x  [] ES(attended) (33793)   [] Dry Needling 1-2 muscles (08733):  [] Dry Needling 3+ muscles (962225  [] Group:      [] Other:     GOALS:  Patient stated goal: improving mobility  [] Progressing: [] Met: [] Not Met: [] Adjusted    Therapist goals for Patient:   Short Term Goals: To be achieved in: 4 weeks  1. Independent in HEP and progression per patient tolerance, in order to prevent re-injury. [] Progressing: [] Met: [] Not Met: [] Adjusted  2. Patient will have a decrease in pain to 0/10 with sit-stand from toilet to improve ability to perform toileting at [facility]. [] Progressing: [] Met: [] Not Met: [] Adjusted  3. Patient will demo normalized gait pattern without AD or Trendelenburg. [] Progressing: [] Met: [] Not Met: [] Adjusted  4. Patient will increased L quad strength as demonstrated by reciprocal pattern on stairs without hand rail to return to PLOF with mobility within home. [] Progressing: [] Met: [] Not Met: [] Adjusted    Long Term Goals: To be achieved in: 8 weeks  1. FOTO score of at least 75 to assist with reaching prior level of function with ADLs. [] Progressing: [] Met: [] Not Met: [] Adjusted  2. Patient will demonstrate increased hip flex AROM to 110 or higher to allow for improved ability to squat to ground to  shoes. [] Progressing: [] Met: [] Not Met: [] Adjusted  3.  Patient will demonstrate an increase in global hip Strength to at least 4/5 to improve functional LE strength for improved ability to ambulate community distances. [] Progressing: [] Met: [] Not Met: [] Adjusted  4. Patient will demo 10 or more reps on 30 sec STS test to indicate improved functional LE strength. [] Progressing: [] Met: [] Not Met: [] Adjusted  5. Pt will score 520meters or higher on 6MWT for age appropriate community ambulation. [] Progressing: [] Met: [] Not Met: [] Adjusted    Overall Progression Towards Functional goals/ Treatment Progress Update:  [] Patient is progressing as expected towards functional goals listed. [] Progression is slowed due to complexities/Impairments listed. [] Progression has been slowed due to co-morbidities. [x] Plan just implemented, too soon to assess goals progression <30days   [] Goals require adjustment due to lack of progress  [] Patient is not progressing as expected and requires additional follow up with physician  [] Other    Persisting Functional Limitations/Impairments:  []Sitting []Standing   []Walking []Stairs   []Transfers []ADLs   []Squatting/bending []Kneeling  []Housework []Job related tasks  []Driving []Sports/Recreation   []Sleeping []Other:    ASSESSMENT:  Pt arrived to clinic without AD but Trendelenburg and decreased stance time on L LE. Pt unable to perform forward step up onto 4 in step d/t weakness and prominent use of UE assist with lateral step. Pt fatigued quickly with strength interventions and noted anterior hip irritation with any hip flex movement but no increase in pain.     Treatment/Activity Tolerance:  [] Pt able to complete treatment [x] Patient limited by fatique  [x] Patient limited by pain  [] Patient limited by other medical complications  [] Other:     Prognosis:  [x] Good [] Fair  [] Poor    Patient Requires Follow-up: [x] Yes  [] No    Return to Play:    [x]  N/A   []  Stage 1: Intro to Strength   []  Stage 2: Return to Run and Strength   []  Stage 3: Return to Jump and Strength   []  Stage 4: Dynamic Strength and Agility   []  Stage 5: Sport Specific Training     []  Ready to Return to Play, Meets All Above Stages   []  Not Ready for Return to Sports   Comments:            PLAN: See eval. PT 2x / week for 8 weeks. [x] Continue per plan of care [] Alter current plan (see comments)  [] Plan of care initiated [] Hold pending MD visit [] Discharge    Electronically signed by: Britt Stage, PT, DPT, ATC      Note: If patient does not return for scheduled/ recommended follow up visits, this note will serve as a discharge from care along with most recent update on progress.

## 2022-04-04 ENCOUNTER — HOSPITAL ENCOUNTER (OUTPATIENT)
Dept: PHYSICAL THERAPY | Age: 72
Setting detail: THERAPIES SERIES
Discharge: HOME | End: 2022-04-04
Payer: COMMERCIAL

## 2022-04-04 PROCEDURE — 97110 THERAPEUTIC EXERCISES: CPT

## 2022-04-04 PROCEDURE — 97530 THERAPEUTIC ACTIVITIES: CPT

## 2022-04-04 NOTE — FLOWSHEET NOTE
Real [facility]  Phone: [phone]   Fax: [phone]    Physical Therapy Treatment Note/ Progress Report:     Date:  2022    Patient Name:  Liz Turk  \"Bill\" :  1950  MRN: [id number]  Restrictions/Precautions:    Medical/Treatment Diagnosis Information:  · Diagnosis: Z96.642 (ICD-10-CM) - S/P total left hip arthroplasty  · Treatment Diagnosis: L hip weakness, decreased ROM, abnormal gait  Insurance/Certification information:  PT Insurance Information: BCBS, no copay ot auth, $750 ded Covered at 90% once DED is met, visits BMN  Physician Information:  Referring Practitioner: Dr. Shayy Ag of care signed (Y/N): []  Yes [x]  No     Date of Patient follow up with Physician:      Progress Report: []  Yes  [x]  No     Date Range for reporting period:  Beginning: 3/28/22  Ending:     Progress report due (10 Rx/or 30 days whichever is less): visit #10 or 54 (date)     Recertification due (POC duration/ or 90 days whichever is less): visit #16 or 22 (date)     Visit # Insurance Allowable Auth required? Date Range   3 BMN []  Yes  [x]  No          Latex Allergy:  [x]NO      []YES  Preferred Language for Healthcare:   [x]English       []other:    Functional Scale:           Date assessed:  TO physical FS primary measure score = 53; risk adjusted = 47  3/28/22    Pain level:  2-3/10     SUBJECTIVE:  Has trouble putting on socks, not really painful, more stiff and achy. Feels good with resting. States feeling like hip is \"a little bit better\" and had a little bit easier time getting socks on today. Continuing to do HEP without difficulty.      OBJECTIVE:    gait without AD but + Trendelenburg and decreased stance time on L LE.    RESTRICTIONS/PRECAUTIONS: s/p L THR on 22, anterior hip precautions    Exercises/Interventions:     Therapeutic Exercise (12779)  Resistance / level Sets/sec Reps Notes / Cues Recumbent bike (if able to maintain hip precautions)  5min     Standing hip ABD  2 10B Tactile cueing to limit R side trunk lean   Standing hip flex  2/5\" 10 L only    bridges  5\" 10 Green strap at ankles to inc ab activation   SB curls hip flex AROM  10\" 10    Lateral walk       Supine HSS with strap  30\" 3    Heel raises  3 10                  Therapeutic Activities (66024)            Shuttle squats  3 10    Tandem on airex  30\" 3 Surgical leg forward   Lateral step up (up and over) 4in 2 10 ^4/4   Step forward and over 4\" 2 10 Added 4/4   Stork stand UE assist 30\" 3B    Neuromuscular Re-ed (25397)       biodex balance PS L 11 3'  Added 4/4                 Manual Intervention (77992)       Knee mobs/PROM       Tib/Fem Mobs       Patella Mobs       Ankle mobs                         Modalities:     Pt. Education:  -pt educated on diagnosis, prognosis and expectations for rehab  -all pt questions were answered    Home Exercise Program:  Access Code: [email or web address]  URL: [email or web address]  Date: 03/28/2022  Prepared by:  Radha Brunson    Exercises  Supine Heel Slide - 1 x daily - 7 x weekly - 3 sets - 10 reps  Standing Hip Abduction with Resistance at Ankles and Counter Support - 1 x daily - 7 x weekly - 3 sets - 10 reps  Single Leg Balance in March Position - 1 x daily - 7 x weekly - 3 sets - 30 sec hold  Standing Hip Flexion with Resistance Loop - 1 x daily - 7 x weekly - 3 sets - 10 reps  Supine Bridge - 1 x daily - 7 x weekly - 2 sets - 10 reps - 5 sec hold      Therapeutic Exercise and NMR EXR  [x] (55408) Provided verbal/tactile cueing for activities related to strengthening, flexibility, endurance, ROM for improvements in LE, proximal hip, and core control with self care, mobility, lifting, ambulation.  [] (67548) Provided verbal/tactile cueing for activities related to improving balance, coordination, kinesthetic sense, posture, motor skill, proprioception  to assist with LE, proximal hip, and core control in self care, mobility, lifting, ambulation and eccentric single leg control.   [] (31666) Therapist is in constant attendance of 2 or more patients providing skilled therapy interventions, but not providing any significant amount of measurable one-on-one time to either patient, for improvements in LE, proximal hip, and core control in self care, mobility, lifting, ambulation and eccentric single leg control. NMR and Therapeutic Activities:    [x] (01743 or 49611) Provided verbal/tactile cueing for activities related to improving balance, coordination, kinesthetic sense, posture, motor skill, proprioception and motor activation to allow for proper function of core, proximal hip and LE with self care and ADLs  [] (55370) Gait Re-education- Provided training and instruction to the patient for proper LE, core and proximal hip recruitment and positioning and eccentric body weight control with ambulation re-education including up and down stairs     Home Exercise Program:    [] (28747) Reviewed/Progressed HEP activities related to strengthening, flexibility, endurance, ROM of core, proximal hip and LE for functional self-care, mobility, lifting and ambulation/stair navigation   [] (30559)Reviewed/Progressed HEP activities related to improving balance, coordination, kinesthetic sense, posture, motor skill, proprioception of core, proximal hip and LE for self care, mobility, lifting, and ambulation/stair navigation      Manual Treatments:  PROM / STM / Oscillations-Mobs:  G-I, II, III, IV (PA's, Inf., Post.)  [] (81111) Provided manual therapy to mobilize LE, proximal hip and/or LS spine soft tissue/joints for the purpose of modulating pain, promoting relaxation,  increasing ROM, reducing/eliminating soft tissue swelling/inflammation/restriction, improving soft tissue extensibility and allowing for proper ROM for normal function with self care, mobility, lifting and ambulation.      Modalities:  [] (27297) Vasopneumatic compression: Utilized vasopneumatic compression to decrease edema / swelling for the purpose of improving mobility and quad tone / recruitment which will allow for increased overall function including but not limited to self-care, transfers, ambulation, and ascending / descending stairs. Charges:  Timed Code Treatment Minutes: 45   Total Treatment Minutes: 45     [] EVAL - LOW (93410)   [] EVAL - MOD (29738)  [] EVAL - HIGH (55808)  [] RE-EVAL (55304)  [x] BR(64317) x   2    [] Ionto  [] NMR (78047) x       [] Vaso  [] Manual (58217) x       [] Ultrasound  [x] TA x 1       [] Mech Traction (05468)  [] Aquatic Therapy x      [] ES (un) (39465):   [] Home Management Training x  [] ES(attended) (40282)   [] Dry Needling 1-2 muscles (36318):  [] Dry Needling 3+ muscles (945915)  [] Group:      [] Other:     GOALS:  Patient stated goal: improving mobility  [] Progressing: [] Met: [] Not Met: [] Adjusted    Therapist goals for Patient:   Short Term Goals: To be achieved in: 4 weeks  1. Independent in HEP and progression per patient tolerance, in order to prevent re-injury. [] Progressing: [] Met: [] Not Met: [] Adjusted  2. Patient will have a decrease in pain to 0/10 with sit-stand from toilet to improve ability to perform toileting at [facility]. [] Progressing: [] Met: [] Not Met: [] Adjusted  3. Patient will demo normalized gait pattern without AD or Trendelenburg. [] Progressing: [] Met: [] Not Met: [] Adjusted  4. Patient will increased L quad strength as demonstrated by reciprocal pattern on stairs without hand rail to return to PLOF with mobility within home. [] Progressing: [] Met: [] Not Met: [] Adjusted    Long Term Goals: To be achieved in: 8 weeks  1. FOTO score of at least 75 to assist with reaching prior level of function with ADLs. [] Progressing: [] Met: [] Not Met: [] Adjusted  2.  Patient will demonstrate increased hip flex AROM to 110 or higher to allow for improved ability to squat to ground to  shoes. [] Progressing: [] Met: [] Not Met: [] Adjusted  3. Patient will demonstrate an increase in global hip Strength to at least 4/5 to improve functional LE strength for improved ability to ambulate community distances. [] Progressing: [] Met: [] Not Met: [] Adjusted  4. Patient will demo 10 or more reps on 30 sec STS test to indicate improved functional LE strength. [] Progressing: [] Met: [] Not Met: [] Adjusted  5. Pt will score 520meters or higher on 6MWT for age appropriate community ambulation. [] Progressing: [] Met: [] Not Met: [] Adjusted    Overall Progression Towards Functional goals/ Treatment Progress Update:  [] Patient is progressing as expected towards functional goals listed. [] Progression is slowed due to complexities/Impairments listed. [] Progression has been slowed due to co-morbidities. [x] Plan just implemented, too soon to assess goals progression <30days   [] Goals require adjustment due to lack of progress  [] Patient is not progressing as expected and requires additional follow up with physician  [] Other    Persisting Functional Limitations/Impairments:  []Sitting []Standing   []Walking []Stairs   []Transfers []ADLs   []Squatting/bending []Kneeling  []Housework []Job related tasks  []Driving []Sports/Recreation   []Sleeping []Other:    ASSESSMENT:  Pt continues to ambulate without use of AD with education provided this date on using crutch or cane for support to limit limp with ambulation and to promote improved mechanics. Pt demonstrates need for cueing to focus on speed with exercises for improved muscle control and strength. Deficits noted in balance with bilateral LE's, especially on L side compared to R side. Verbal cues to improve muscle contraction of L glutes and LE muscles to push up laterally onto step and limit over push with R LE.  Pt continues to demonstrate considerable weakness in L hip post op with decreased proprioception and strength for proper muscle facilitation and mechanics without overcompensation. Muscle fatigue noted by end of session this date. Continue with skilled therapy to progress strength, endurance and functional mobility with decreased compensations. Treatment/Activity Tolerance:  [] Pt able to complete treatment [x] Patient limited by fatique  [x] Patient limited by pain  [] Patient limited by other medical complications  [] Other:     Prognosis:  [x] Good [] Fair  [] Poor    Patient Requires Follow-up: [x] Yes  [] No    Return to Play:    [x]  N/A   []  Stage 1: Intro to Strength   []  Stage 2: Return to Run and Strength   []  Stage 3: Return to Jump and Strength   []  Stage 4: Dynamic Strength and Agility   []  Stage 5: Sport Specific Training   []  Ready to Return to Play, Meets All Above Stages   []  Not Ready for Return to Sports   Comments:            PLAN: See eval. PT 2x / week for 8 weeks. [x] Continue per plan of care [] Alter current plan (see comments)  [] Plan of care initiated [] Hold pending MD visit [] Discharge    Electronically signed by: Emeterio Galan, PTA [id number]      Note: If patient does not return for scheduled/ recommended follow up visits, this note will serve as a discharge from care along with most recent update on progress.

## 2022-04-07 ENCOUNTER — HOSPITAL ENCOUNTER (OUTPATIENT)
Dept: PHYSICAL THERAPY | Age: 72
Setting detail: THERAPIES SERIES
Discharge: HOME | End: 2022-04-07
Payer: COMMERCIAL

## 2022-04-07 PROCEDURE — 97530 THERAPEUTIC ACTIVITIES: CPT

## 2022-04-07 PROCEDURE — 97110 THERAPEUTIC EXERCISES: CPT

## 2022-04-07 NOTE — FLOWSHEET NOTE
[facility]  Phone: [phone]   Fax: [phone]    Physical Therapy Treatment Note/ Progress Report:     Date:  2022    Patient Name:  Arlyn Benedict  \"Bill\" :  1950  MRN: [id number]  Restrictions/Precautions:    Medical/Treatment Diagnosis Information:  · Diagnosis: Z96.642 (ICD-10-CM) - S/P total left hip arthroplasty  · Treatment Diagnosis: L hip weakness, decreased ROM, abnormal gait  Insurance/Certification information:  PT Insurance Information: BCBS, no copay ot auth, $750 ded Covered at 90% once DED is met, visits BMN  Physician Information:  Referring Practitioner: Dr. Debby Noe of care signed (Y/N): []  Yes [x]  No     Date of Patient follow up with Physician:      Progress Report: []  Yes  [x]  No     Date Range for reporting period:  Beginning: 3/28/22  Ending:     Progress report due (10 Rx/or 30 days whichever is less): visit #10 or 74 (date)     Recertification due (POC duration/ or 90 days whichever is less): visit #16 or 22 (date)      Visit # Insurance Allowable Auth required? Date Range   4 BMN []  Yes  [x]  No          Latex Allergy:  [x]NO      []YES  Preferred Language for Healthcare:   [x]English       []other:    Functional Scale:           Date assessed:  TO physical FS primary measure score = 53; risk adjusted = 47  3/28/22    Pain level:  2-3/10     SUBJECTIVE: Stiffness this morning and progresses as day goes along. No significant pain, mild ache. \"I feel like I've hit a peak. The first couple weeks I noticed and improvement each day but the last couple days I've not noticed as much of a daily difference\". Has not been using cane at home. Did well with upgrades from last visit but was fatigued, mild muscle soreness.      OBJECTIVE:    gait without AD but + Trendelenburg and decreased stance time on L LE.    RESTRICTIONS/PRECAUTIONS: s/p L THR on 22, anterior hip precautions    Exercises/Interventions:     Therapeutic Exercise (95743)  Resistance / level Sets/sec Reps Notes / Cues   Recumbent bike (if able to maintain hip precautions)  5min     Standing hip ABD  2 10B Tactile cueing to limit R side trunk lean   Standing hip flex  2/5\" 10 L only    bridges  5\" 10 Green strap at ankles to inc ab activation   SB curls hip flex AROM  10\" 10    Lateral walk       Supine HSS with strap  30\" 3    Heel raises  3 10 Limit hand hold assist for improved balance    Hip abduction isometrics       Lateral step downs  4\"/2 10 Added 4/7  Cueing for proper technique                        Therapeutic Activities (19718)            Shuttle squats  3 10    Tandem on airex  30\" 3 Surgical leg forward   Lateral step up (up and over) 4 in 2 10 ^4/4   Step forward and over 4\" 2 10 Added 4/4   Stork stand UE assist 30\" 3B    Neuromuscular Re-ed (32222)       biodex balance PS L 11 3'  Added 4/4   Balance board taps A/P, R/L              Manual Intervention (08010)       Gentle hip PROM flexion/ER/IR modified ROM  7'                                            Modalities:     Pt. Education:  -pt educated on diagnosis, prognosis and expectations for rehab  -all pt questions were answered    Home Exercise Program:  Access Code: [email or web address]  URL: [email or web address]  Date: 03/28/2022  Prepared by:  Jess Leisure    Exercises  Supine Heel Slide - 1 x daily - 7 x weekly - 3 sets - 10 reps  Standing Hip Abduction with Resistance at Ankles and Counter Support - 1 x daily - 7 x weekly - 3 sets - 10 reps  Single Leg Balance in March Position - 1 x daily - 7 x weekly - 3 sets - 30 sec hold  Standing Hip Flexion with Resistance Loop - 1 x daily - 7 x weekly - 3 sets - 10 reps  Supine Bridge - 1 x daily - 7 x weekly - 2 sets - 10 reps - 5 sec hold      Therapeutic Exercise and NMR EXR  [x] (36653) Provided verbal/tactile cueing for activities related to strengthening, flexibility, endurance, ROM for improvements in LE, proximal hip, and core control with self care, mobility, lifting, ambulation.  [] (99912) Provided verbal/tactile cueing for activities related to improving balance, coordination, kinesthetic sense, posture, motor skill, proprioception  to assist with LE, proximal hip, and core control in self care, mobility, lifting, ambulation and eccentric single leg control.   [] (17330) Therapist is in constant attendance of 2 or more patients providing skilled therapy interventions, but not providing any significant amount of measurable one-on-one time to either patient, for improvements in LE, proximal hip, and core control in self care, mobility, lifting, ambulation and eccentric single leg control.      NMR and Therapeutic Activities:    [x] (35801 or 58338) Provided verbal/tactile cueing for activities related to improving balance, coordination, kinesthetic sense, posture, motor skill, proprioception and motor activation to allow for proper function of core, proximal hip and LE with self care and ADLs  [] (18774) Gait Re-education- Provided training and instruction to the patient for proper LE, core and proximal hip recruitment and positioning and eccentric body weight control with ambulation re-education including up and down stairs     Home Exercise Program:    [] (39801) Reviewed/Progressed HEP activities related to strengthening, flexibility, endurance, ROM of core, proximal hip and LE for functional self-care, mobility, lifting and ambulation/stair navigation   [] (65086)Reviewed/Progressed HEP activities related to improving balance, coordination, kinesthetic sense, posture, motor skill, proprioception of core, proximal hip and LE for self care, mobility, lifting, and ambulation/stair navigation      Manual Treatments:  PROM / STM / Oscillations-Mobs:  G-I, II, III, IV (PA's, Inf., Post.)  [] (93438) Provided manual therapy to mobilize LE, proximal hip and/or LS spine soft tissue/joints for the Adjusted    Long Term Goals: To be achieved in: 8 weeks  1. FOTO score of at least 75 to assist with reaching prior level of function with ADLs. [] Progressing: [] Met: [] Not Met: [] Adjusted  2. Patient will demonstrate increased hip flex AROM to 110 or higher to allow for improved ability to squat to ground to  shoes. [] Progressing: [] Met: [] Not Met: [] Adjusted  3. Patient will demonstrate an increase in global hip Strength to at least 4/5 to improve functional LE strength for improved ability to ambulate community distances. [] Progressing: [] Met: [] Not Met: [] Adjusted  4. Patient will demo 10 or more reps on 30 sec STS test to indicate improved functional LE strength. [] Progressing: [] Met: [] Not Met: [] Adjusted  5. Pt will score 520meters or higher on 6MWT for age appropriate community ambulation. [] Progressing: [] Met: [] Not Met: [] Adjusted    Overall Progression Towards Functional goals/ Treatment Progress Update:  [] Patient is progressing as expected towards functional goals listed. [] Progression is slowed due to complexities/Impairments listed. [] Progression has been slowed due to co-morbidities. [x] Plan just implemented, too soon to assess goals progression <30days   [] Goals require adjustment due to lack of progress  [] Patient is not progressing as expected and requires additional follow up with physician  [] Other    Persisting Functional Limitations/Impairments:  []Sitting []Standing   []Walking []Stairs   []Transfers []ADLs   []Squatting/bending []Kneeling  []Housework []Job related tasks  []Driving []Sports/Recreation   []Sleeping []Other:    ASSESSMENT:  Continues to ambulate with limp, especially upon initial steps after sitting, due to decreased gluteal strength post surgery. Reports decreased anterior hip soreness post gentle passive ROM this date.  Pt challenged by upgraded exercise this date with increased focus glute activation with step ups and balance exercises. Pt demonstrates limitations with balance needing finger touch assist for stability during routine this date. Pt demonstrates and report increased muscle fatigue with session this date with plans to continue upgrades next visit for continued progression of balance and strength. Treatment/Activity Tolerance:  [] Pt able to complete treatment [x] Patient limited by fatique  [x] Patient limited by pain  [] Patient limited by other medical complications  [] Other:     Prognosis:  [x] Good [] Fair  [] Poor    Patient Requires Follow-up: [x] Yes  [] No    Return to Play:    [x]  N/A   []  Stage 1: Intro to Strength   []  Stage 2: Return to Run and Strength   []  Stage 3: Return to Jump and Strength   []  Stage 4: Dynamic Strength and Agility   []  Stage 5: Sport Specific Training   []  Ready to Return to Play, Meets All Above Stages   []  Not Ready for Return to Sports   Comments:            PLAN: See eval. PT 2x / week for 8 weeks. [x] Continue per plan of care [] Alter current plan (see comments)  [] Plan of care initiated [] Hold pending MD visit [] Discharge    Electronically signed by: Robi Kat, PTA [id number]      Note: If patient does not return for scheduled/ recommended follow up visits, this note will serve as a discharge from care along with most recent update on progress.

## 2022-04-11 ENCOUNTER — HOSPITAL ENCOUNTER (OUTPATIENT)
Dept: PHYSICAL THERAPY | Age: 72
Setting detail: THERAPIES SERIES
Discharge: HOME | End: 2022-04-11
Payer: COMMERCIAL

## 2022-04-11 PROCEDURE — 97530 THERAPEUTIC ACTIVITIES: CPT

## 2022-04-11 PROCEDURE — 97110 THERAPEUTIC EXERCISES: CPT

## 2022-04-11 NOTE — FLOWSHEET NOTE
[facility]  Phone: [phone]   Fax: [phone]    Physical Therapy Treatment Note/ Progress Report:     Date:  2022    Patient Name:  Caro Hernandez  \"Bill\" :  1950  MRN: [id number]  Restrictions/Precautions:    Medical/Treatment Diagnosis Information:  · Diagnosis: Z96.642 (ICD-10-CM) - S/P total left hip arthroplasty  · Treatment Diagnosis: L hip weakness, decreased ROM, abnormal gait  Insurance/Certification information:  PT Insurance Information: BCBS, no copay ot auth, $750 ded Covered at 90% once DED is met, visits BMN  Physician Information:  Referring Practitioner: Dr. Jane Linder of care signed (Y/N): []  Yes [x]  No     Date of Patient follow up with Physician:      Progress Report: []  Yes  [x]  No     Date Range for reporting period:  Beginning: 3/28/22  Ending:     Progress report due (10 Rx/or 30 days whichever is less): visit #10 or  (date)     Recertification due (POC duration/ or 90 days whichever is less): visit #16 or 22 (date)      Visit # Insurance Allowable Auth required? Date Range   5 BMN []  Yes  [x]  No          Latex Allergy:  [x]NO      []YES  Preferred Language for Healthcare:   [x]English       []other:    Functional Scale:           Date assessed:  FOTO physical FS primary measure score = 53; risk adjusted = 47  3/28/22    Pain level:  0/10     SUBJECTIVE: Pt reports continued improvement in pain and stiffness but still has \"twinges\" with hip flex. Improved ability to get out of car.      OBJECTIVE:    gait without AD but + Trendelenburg and decreased stance time on L LE.    RESTRICTIONS/PRECAUTIONS: s/p L THR on 22, anterior hip precautions    Exercises/Interventions:     Therapeutic Exercise (45611)  Resistance / level Sets/sec Reps Notes / Cues   Recumbent bike (if able to maintain hip precautions)  5min     Standing hip ABD orange 2 10B Tactile cueing to limit R side trunk lean   Standing hip flex orange 2 10 L only    bridges  10\" 10 Green strap at ankles to inc ab activation, vc's to push through heels   SB curls hip flex AROM  10\" 10    Lateral walk- NPV       Supine HSS with strap  30\" 3    Heel raises-eccentric  2 10 Limit hand hold assist for improved balance                                Therapeutic Activities (16256)            Shuttle squats  3 10    Tandem on airex  30\" 3 Surgical leg forward   Lateral step downs 4 in 2 10 ^4/4   Step forward and over-6\" 1 15 Added 4/4; watch for R LE vaulting, improve with VCs! Lateral and posterior slides (L LE stance)  2 10ea Block anterior translation of knee! HEP   Neuromuscular Re-ed (13658)       biodex balance PS L 11 1.5' 2 Added 4/4   Balance board taps A/P, R/L              Manual Intervention ([id number])                                                  Modalities:     Pt. Education:  -pt educated on diagnosis, prognosis and expectations for rehab  -all pt questions were answered    Home Exercise Program:  Access Code: [email or web address]  URL: [email or web address]  Date: 03/28/2022  Prepared by:  Snow Osorio    Exercises  Supine Heel Slide - 1 x daily - 7 x weekly - 3 sets - 10 reps  Standing Hip Abduction with Resistance at Ankles and Counter Support - 1 x daily - 7 x weekly - 3 sets - 10 reps  Single Leg Balance in March Position - 1 x daily - 7 x weekly - 3 sets - 30 sec hold  Standing Hip Flexion with Resistance Loop - 1 x daily - 7 x weekly - 3 sets - 10 reps  Supine Bridge - 1 x daily - 7 x weekly - 2 sets - 10 reps - 5 sec hold      Therapeutic Exercise and NMR EXR  [x] (98612) Provided verbal/tactile cueing for activities related to strengthening, flexibility, endurance, ROM for improvements in LE, proximal hip, and core control with self care, mobility, lifting, ambulation.  [] (58764) Provided verbal/tactile cueing for activities related to improving balance, coordination, kinesthetic sense, posture, motor skill, proprioception  to assist with LE, proximal hip, and core control in self care, mobility, lifting, ambulation and eccentric single leg control.   [] (20490) Therapist is in constant attendance of 2 or more patients providing skilled therapy interventions, but not providing any significant amount of measurable one-on-one time to either patient, for improvements in LE, proximal hip, and core control in self care, mobility, lifting, ambulation and eccentric single leg control.      NMR and Therapeutic Activities:    [x] (01234 or 15431) Provided verbal/tactile cueing for activities related to improving balance, coordination, kinesthetic sense, posture, motor skill, proprioception and motor activation to allow for proper function of core, proximal hip and LE with self care and ADLs  [] (10542) Gait Re-education- Provided training and instruction to the patient for proper LE, core and proximal hip recruitment and positioning and eccentric body weight control with ambulation re-education including up and down stairs     Home Exercise Program:    [] (73406) Reviewed/Progressed HEP activities related to strengthening, flexibility, endurance, ROM of core, proximal hip and LE for functional self-care, mobility, lifting and ambulation/stair navigation   [] (29084)Reviewed/Progressed HEP activities related to improving balance, coordination, kinesthetic sense, posture, motor skill, proprioception of core, proximal hip and LE for self care, mobility, lifting, and ambulation/stair navigation      Manual Treatments:  PROM / STM / Oscillations-Mobs:  G-I, II, III, IV (PA's, Inf., Post.)  [] (37244) Provided manual therapy to mobilize LE, proximal hip and/or LS spine soft tissue/joints for the purpose of modulating pain, promoting relaxation,  increasing ROM, reducing/eliminating soft tissue swelling/inflammation/restriction, improving soft tissue extensibility and allowing for proper ROM for normal function with self care, mobility, lifting and ambulation. Modalities:  [] (20287) Vasopneumatic compression: Utilized vasopneumatic compression to decrease edema / swelling for the purpose of improving mobility and quad tone / recruitment which will allow for increased overall function including but not limited to self-care, transfers, ambulation, and ascending / descending stairs. Charges:  Timed Code Treatment Minutes: 41   Total Treatment Minutes: 41     [] EVAL - LOW (29055)   [] EVAL - MOD (03151)  [] EVAL - HIGH (59551)  [] RE-EVAL (62381)  [x] AA(93763) x   1    [] Ionto  [] NMR (45262) x       [] Vaso  [] Manual (72088) x       [] Ultrasound  [x] TA x 2      [] Mech Traction (24958)  [] Aquatic Therapy x      [] ES (un) (66948):   [] Home Management Training x  [] ES(attended) (43623)   [] Dry Needling 1-2 muscles (55001):  [] Dry Needling 3+ muscles (789809)  [] Group:      [] Other:     GOALS:  Patient stated goal: improving mobility  [] Progressing: [] Met: [] Not Met: [] Adjusted    Therapist goals for Patient:   Short Term Goals: To be achieved in: 4 weeks  1. Independent in HEP and progression per patient tolerance, in order to prevent re-injury. [] Progressing: [] Met: [] Not Met: [] Adjusted  2. Patient will have a decrease in pain to 0/10 with sit-stand from toilet to improve ability to perform toileting at [facility]. [] Progressing: [] Met: [] Not Met: [] Adjusted  3. Patient will demo normalized gait pattern without AD or Trendelenburg. [] Progressing: [] Met: [] Not Met: [] Adjusted  4. Patient will increased L quad strength as demonstrated by reciprocal pattern on stairs without hand rail to return to PLOF with mobility within home. [] Progressing: [] Met: [] Not Met: [] Adjusted    Long Term Goals: To be achieved in: 8 weeks  1. FOTO score of at least 75 to assist with reaching prior level of function with ADLs. [] Progressing: [] Met: [] Not Met: [] Adjusted  2.  Patient will demonstrate increased hip flex AROM to 110 or higher to allow for improved ability to squat to ground to  shoes. [] Progressing: [] Met: [] Not Met: [] Adjusted  3. Patient will demonstrate an increase in global hip Strength to at least 4/5 to improve functional LE strength for improved ability to ambulate community distances. [] Progressing: [] Met: [] Not Met: [] Adjusted  4. Patient will demo 10 or more reps on 30 sec STS test to indicate improved functional LE strength. [] Progressing: [] Met: [] Not Met: [] Adjusted  5. Pt will score 520meters or higher on 6MWT for age appropriate community ambulation. [] Progressing: [] Met: [] Not Met: [] Adjusted    Overall Progression Towards Functional goals/ Treatment Progress Update:  [] Patient is progressing as expected towards functional goals listed. [] Progression is slowed due to complexities/Impairments listed. [] Progression has been slowed due to co-morbidities. [x] Plan just implemented, too soon to assess goals progression <30days   [] Goals require adjustment due to lack of progress  [] Patient is not progressing as expected and requires additional follow up with physician  [] Other    Persisting Functional Limitations/Impairments:  []Sitting []Standing   []Walking []Stairs   []Transfers []ADLs   []Squatting/bending []Kneeling  []Housework []Job related tasks  []Driving []Sports/Recreation   []Sleeping []Other:    ASSESSMENT:  Pt able to progress to resisted hip ABD but fatigued quickly; progressed to lateral and posterior slides but minimal ROM. Increased step height with step ups and requires vc's to eliminate R LE vaulting. Pt c/o \"tightness\" and fatigue post session but no pain. Will continue tor progress functional LE strength and balance as tolerated.   Treatment/Activity Tolerance:  [x] Pt able to complete treatment [x] Patient limited by fatique  [] Patient limited by pain  [] Patient limited by other medical complications  [] Other:     Prognosis:  [x] Good [] Fair  [] Poor    Patient Requires Follow-up: [x] Yes  [] No    Return to Play:    [x]  N/A   []  Stage 1: Intro to Strength   []  Stage 2: Return to Run and Strength   []  Stage 3: Return to Jump and Strength   []  Stage 4: Dynamic Strength and Agility   []  Stage 5: Sport Specific Training   []  Ready to Return to Play, Meets All Above Stages   []  Not Ready for Return to Sports   Comments:            PLAN: See eval. PT 2x / week for 8 weeks. [x] Continue per plan of care [] Alter current plan (see comments)  [] Plan of care initiated [] Hold pending MD visit [] Discharge    Electronically signed by: Shagufta Tucker PT, DPT ATC      Note: If patient does not return for scheduled/ recommended follow up visits, this note will serve as a discharge from care along with most recent update on progress.

## 2022-04-15 ENCOUNTER — HOSPITAL ENCOUNTER (OUTPATIENT)
Dept: PHYSICAL THERAPY | Age: 72
Setting detail: THERAPIES SERIES
Discharge: HOME | End: 2022-04-15
Payer: COMMERCIAL

## 2022-04-15 NOTE — FLOWSHEET NOTE
Physical Therapy  Cancellation/No-show Note  Patient Name:  Carmen Mendoza  :  1950   Date:  4/15/2022  Cancelled visits to date: 1  No-shows to date: 0    Patient status for today's appointment patient:  []  Cancelled  []  Rescheduled appointment  []  No-show     Reason given by patient:  []  Patient ill  []  Conflicting appointment  []  No transportation    []  Conflict with work  []  No reason given  [x]  Other:  \"something came up\"   Comments:      Phone call information:   []  Phone call made today to patient at _ time at number provided:      []  Patient answered, conversation as follows:    []  Patient did not answer, message left as follows:  []  Phone call not made today  [x]  Phone call not needed - pt contacted us to cancel and provided reason for cancellation. Electronically signed by:   Kym Moreno PT  DPT ATC

## 2022-04-18 ENCOUNTER — HOSPITAL ENCOUNTER (OUTPATIENT)
Dept: PHYSICAL THERAPY | Age: 72
Setting detail: THERAPIES SERIES
Discharge: HOME | End: 2022-04-18
Payer: COMMERCIAL

## 2022-04-18 PROCEDURE — 97110 THERAPEUTIC EXERCISES: CPT

## 2022-04-18 PROCEDURE — 97530 THERAPEUTIC ACTIVITIES: CPT

## 2022-04-18 NOTE — FLOWSHEET NOTE
[facility]  Phone: [phone]   Fax: [phone]    Physical Therapy Treatment Note/ Progress Report:     Date:  2022    Patient Name:  Michaela Alston  \"Bill\" :  1950  MRN: [id number]  Restrictions/Precautions:    Medical/Treatment Diagnosis Information:  · Diagnosis: Z96.642 (ICD-10-CM) - S/P total left hip arthroplasty  · Treatment Diagnosis: L hip weakness, decreased ROM, abnormal gait  Insurance/Certification information:  PT Insurance Information: BCBS, no copay ot auth, $750 ded Covered at 90% once DED is met, visits BMN  Physician Information:  Referring Practitioner: Dr. Troy Haas of care signed (Y/N): []  Yes [x]  No     Date of Patient follow up with Physician:      Progress Report: []  Yes  [x]  No     Date Range for reporting period:  Beginning: 3/28/22  Ending:     Progress report due (10 Rx/or 30 days whichever is less): visit #10 or  (date)     Recertification due (POC duration/ or 90 days whichever is less): visit #16 or 22 (date)      Visit # Insurance Allowable Auth required? Date Range   6 BMN []  Yes  [x]  No          Latex Allergy:  [x]NO      []YES  Preferred Language for Healthcare:   [x]English       []other:    Functional Scale:           Date assessed:  FOTO physical FS primary measure score = 53; risk adjusted = 47  3/28/22    Pain level:  0/10     SUBJECTIVE: Pt reports continued improvement in pain and stiffness but still has \"twinges\" with hip flex. Improved ability to get out of car.      OBJECTIVE:    gait without AD but + Trendelenburg and decreased stance time on L LE.    RESTRICTIONS/PRECAUTIONS: s/p L THR on 22, anterior hip precautions    Exercises/Interventions:     Therapeutic Exercise (39053)  Resistance / level Sets/sec Reps Notes / Cues   Recumbent bike (if able to maintain hip precautions)  5min     Standing hip ABD orange 2 10B Tactile cueing to limit R side trunk lean   Standing hip flex orange 2 10 L only    bridges  10\" 10 Green strap at ankles to inc ab activation, vc's to push through heels   SB curls hip flex AROM  10\" 10    Lateral walk-  lime 15 ft 4 laps    Supine HSS with strap  30\" 3    Heel raises-eccentric  2 10 Limit hand hold assist for improved balance                                Therapeutic Activities (69452)            Shuttle squats  3 10    Surgical leg forward   Lateral step downs 4 in 2 10 ^4/4   Step forward and over-6\" 1 15 Added 4/4; watch for R LE vaulting, improve with VCs! Lateral and posterior slides (L LE stance) Foam roll in front of L LE to prevent excessive ant knee translation 2 10ea Block anterior translation of knee! Leg press NPV     HEP   Neuromuscular Re-ed (85304)       biodex balance PS L 11 1' 2    TA on 4/18      Balance board taps A/P, R/L  NPV       SLS- NPV       bosu step up  1 20 TA on 4/18   Manual Intervention (73515)                                                  Modalities:     Pt. Education:  -pt educated on diagnosis, prognosis and expectations for rehab  -all pt questions were answered    Home Exercise Program:  Access Code: [email or web address]  URL: [email or web address]  Date: 03/28/2022  Prepared by:  [facility]    Exercises  Supine Heel Slide - 1 x daily - 7 x weekly - 3 sets - 10 reps  Standing Hip Abduction with Resistance at Ankles and Counter Support - 1 x daily - 7 x weekly - 3 sets - 10 reps  Single Leg Balance in March Position - 1 x daily - 7 x weekly - 3 sets - 30 sec hold  Standing Hip Flexion with Resistance Loop - 1 x daily - 7 x weekly - 3 sets - 10 reps  Supine Bridge - 1 x daily - 7 x weekly - 2 sets - 10 reps - 5 sec hold      Therapeutic Exercise and NMR EXR  [x] (95144) Provided verbal/tactile cueing for activities related to strengthening, flexibility, endurance, ROM for improvements in LE, proximal hip, and core control with self care, mobility, lifting, ambulation.  [] (93572) Provided verbal/tactile cueing for activities related to improving balance, coordination, kinesthetic sense, posture, motor skill, proprioception  to assist with LE, proximal hip, and core control in self care, mobility, lifting, ambulation and eccentric single leg control.   [] (40055) Therapist is in constant attendance of 2 or more patients providing skilled therapy interventions, but not providing any significant amount of measurable one-on-one time to either patient, for improvements in LE, proximal hip, and core control in self care, mobility, lifting, ambulation and eccentric single leg control.      NMR and Therapeutic Activities:    [x] (34714 or 13672) Provided verbal/tactile cueing for activities related to improving balance, coordination, kinesthetic sense, posture, motor skill, proprioception and motor activation to allow for proper function of core, proximal hip and LE with self care and ADLs  [] (06234) Gait Re-education- Provided training and instruction to the patient for proper LE, core and proximal hip recruitment and positioning and eccentric body weight control with ambulation re-education including up and down stairs     Home Exercise Program:    [] (51282) Reviewed/Progressed HEP activities related to strengthening, flexibility, endurance, ROM of core, proximal hip and LE for functional self-care, mobility, lifting and ambulation/stair navigation   [] (34435)Reviewed/Progressed HEP activities related to improving balance, coordination, kinesthetic sense, posture, motor skill, proprioception of core, proximal hip and LE for self care, mobility, lifting, and ambulation/stair navigation      Manual Treatments:  PROM / STM / Oscillations-Mobs:  G-I, II, III, IV (PA's, Inf., Post.)  [] (39413) Provided manual therapy to mobilize LE, proximal hip and/or LS spine soft tissue/joints for the purpose of modulating pain, promoting relaxation,  increasing ROM, reducing/eliminating soft tissue swelling/inflammation/restriction, improving soft tissue extensibility and allowing for proper ROM for normal function with self care, mobility, lifting and ambulation. Modalities:  [] (94930) Vasopneumatic compression: Utilized vasopneumatic compression to decrease edema / swelling for the purpose of improving mobility and quad tone / recruitment which will allow for increased overall function including but not limited to self-care, transfers, ambulation, and ascending / descending stairs. Charges:  Timed Code Treatment Minutes: 42   Total Treatment Minutes: 42     [] EVAL - LOW (56154)   [] EVAL - MOD (61220)  [] EVAL - HIGH (80431)  [] RE-EVAL (51844)  [x] QS(76181) x   1    [] Ionto  [] NMR (56662) x       [] Vaso  [] Manual (21239) x       [] Ultrasound  [x] TA x 2      [] Mech Traction (63895)  [] Aquatic Therapy x      [] ES (un) (91173):   [] Home Management Training x  [] ES(attended) (51587)   [] Dry Needling 1-2 muscles (73894):  [] Dry Needling 3+ muscles (119849)  [] Group:      [] Other:     GOALS:  Patient stated goal: improving mobility  [] Progressing: [] Met: [] Not Met: [] Adjusted    Therapist goals for Patient:   Short Term Goals: To be achieved in: 4 weeks  1. Independent in HEP and progression per patient tolerance, in order to prevent re-injury. [] Progressing: [] Met: [] Not Met: [] Adjusted  2. Patient will have a decrease in pain to 0/10 with sit-stand from toilet to improve ability to perform toileting at [facility]. [] Progressing: [] Met: [] Not Met: [] Adjusted  3. Patient will demo normalized gait pattern without AD or Trendelenburg. [] Progressing: [] Met: [] Not Met: [] Adjusted  4. Patient will increased L quad strength as demonstrated by reciprocal pattern on stairs without hand rail to return to PLOF with mobility within home. [] Progressing: [] Met: [] Not Met: [] Adjusted    Long Term Goals: To be achieved in: 8 weeks  1.  FOTO score of at least 75 to assist with reaching prior level of function with ADLs. [] Progressing: [] Met: [] Not Met: [] Adjusted  2. Patient will demonstrate increased hip flex AROM to 110 or higher to allow for improved ability to squat to ground to  shoes. [] Progressing: [] Met: [] Not Met: [] Adjusted  3. Patient will demonstrate an increase in global hip Strength to at least 4/5 to improve functional LE strength for improved ability to ambulate community distances. [] Progressing: [] Met: [] Not Met: [] Adjusted  4. Patient will demo 10 or more reps on 30 sec STS test to indicate improved functional LE strength. [] Progressing: [] Met: [] Not Met: [] Adjusted  5. Pt will score 520meters or higher on 6MWT for age appropriate community ambulation. [] Progressing: [] Met: [] Not Met: [] Adjusted    Overall Progression Towards Functional goals/ Treatment Progress Update:  [] Patient is progressing as expected towards functional goals listed. [] Progression is slowed due to complexities/Impairments listed. [] Progression has been slowed due to co-morbidities. [x] Plan just implemented, too soon to assess goals progression <30days   [] Goals require adjustment due to lack of progress  [] Patient is not progressing as expected and requires additional follow up with physician  [] Other    Persisting Functional Limitations/Impairments:  []Sitting []Standing   []Walking []Stairs   []Transfers []ADLs   []Squatting/bending []Kneeling  []Housework []Job related tasks  []Driving []Sports/Recreation   []Sleeping []Other:    ASSESSMENT:  Pt able to increase height with step ups without UE assist but required vc;s to prevent R LE vaulting. Progressed lateral hip strength with lateral walks and initiated bosu step ups to increase dynamic stability. Pt noted fatigue post session. Plan to initiate leg press next session and progress balance interventions.   Treatment/Activity Tolerance:  [x] Pt able to complete treatment [x] Patient limited by ned  [] Patient limited by pain  [] Patient limited by other medical complications  [] Other:     Prognosis:  [x] Good [] Fair  [] Poor    Patient Requires Follow-up: [x] Yes  [] No    Return to Play:    [x]  N/A   []  Stage 1: Intro to Strength   []  Stage 2: Return to Run and Strength   []  Stage 3: Return to Jump and Strength   []  Stage 4: Dynamic Strength and Agility   []  Stage 5: Sport Specific Training   []  Ready to Return to Play, Meets All Above Stages   []  Not Ready for Return to Sports   Comments:            PLAN: See eval. PT 2x / week for 8 weeks. [x] Continue per plan of care [] Alter current plan (see comments)  [] Plan of care initiated [] Hold pending MD visit [] Discharge    Electronically signed by: Edison Grant PT, DPT ATC      Note: If patient does not return for scheduled/ recommended follow up visits, this note will serve as a discharge from care along with most recent update on progress.

## 2022-04-22 ENCOUNTER — HOSPITAL ENCOUNTER (OUTPATIENT)
Dept: PHYSICAL THERAPY | Age: 72
Setting detail: THERAPIES SERIES
Discharge: HOME | End: 2022-04-22
Payer: COMMERCIAL

## 2022-04-22 NOTE — FLOWSHEET NOTE
Physical Therapy  Cancellation/No-show Note  Patient Name:  Dinesh Dailey  :  1950   Date:  2022  Cancelled visits to date: 2  No-shows to date: 0    Patient status for today's appointment patient:  [x]  Cancelled 4/15,   []  Rescheduled appointment  []  No-show     Reason given by patient:  []  Patient ill  []  Conflicting appointment  []  No transportation    []  Conflict with work  []  No reason given  [x]  Other:  Family emergency    Comments:      Phone call information:   []  Phone call made today to patient at _ time at number provided:      []  Patient answered, conversation as follows:    []  Patient did not answer, message left as follows:  []  Phone call not made today  [x]  Phone call not needed - pt contacted us to cancel and provided reason for cancellation. Electronically signed by:   [facility] GUILHERME LOAIZA  DPT ATC

## 2022-04-25 ENCOUNTER — HOSPITAL ENCOUNTER (OUTPATIENT)
Dept: PHYSICAL THERAPY | Age: 72
Setting detail: THERAPIES SERIES
Discharge: HOME | End: 2022-04-25
Payer: COMMERCIAL

## 2022-04-25 NOTE — FLOWSHEET NOTE
Physical Therapy  Cancellation/No-show Note  Patient Name:  Carroll Bueno  :  1950   Date:  2022  Cancelled visits to date: 3  No-shows to date: 0    Patient status for today's appointment patient:  [x]  Cancelled 4/15, ,   []  Rescheduled appointment  []  No-show     Reason given by patient:  []  Patient ill  []  Conflicting appointment  []  No transportation    [x]  Conflict with work  []  No reason given  []  Other:     Comments:      Phone call information:   []  Phone call made today to patient at _ time at number provided:      []  Patient answered, conversation as follows:    []  Patient did not answer, message left as follows:  []  Phone call not made today  [x]  Phone call not needed - pt contacted us to cancel and provided reason for cancellation. Electronically signed by:   Reilly Alvarado PT  DPT ATC

## 2022-04-26 ENCOUNTER — HOSPITAL ENCOUNTER (EMERGENCY)
Age: 72
Discharge: HOME | End: 2022-04-27
Attending: EMERGENCY MEDICINE
Payer: COMMERCIAL

## 2022-04-26 ENCOUNTER — APPOINTMENT (OUTPATIENT)
Dept: CT IMAGING | Age: 72
End: 2022-04-26
Payer: COMMERCIAL

## 2022-04-26 DIAGNOSIS — H60.392 INFECTIVE OTITIS EXTERNA OF LEFT EAR: Primary | ICD-10-CM

## 2022-04-26 LAB
A/G RATIO: 2.2 (ref 1.1–2.2)
ALBUMIN SERPL-MCNC: 5.1 G/DL (ref 3.4–5)
ALP BLD-CCNC: 145 U/L (ref 40–129)
ALT SERPL-CCNC: 12 U/L (ref 10–40)
ANION GAP SERPL CALCULATED.3IONS-SCNC: 16 MMOL/L (ref 3–16)
AST SERPL-CCNC: 15 U/L (ref 15–37)
BASOPHILS ABSOLUTE: 0.1 K/UL (ref 0–0.2)
BASOPHILS RELATIVE PERCENT: 0.7 %
BILIRUB SERPL-MCNC: 0.3 MG/DL (ref 0–1)
BUN BLDV-MCNC: 25 MG/DL (ref 7–20)
CALCIUM SERPL-MCNC: 9.8 MG/DL (ref 8.3–10.6)
CHLORIDE BLD-SCNC: 104 MMOL/L (ref 99–110)
CO2: 22 MMOL/L (ref 21–32)
CREAT SERPL-MCNC: 2.1 MG/DL (ref 0.8–1.3)
EOSINOPHILS ABSOLUTE: 0.4 K/UL (ref 0–0.6)
EOSINOPHILS RELATIVE PERCENT: 3.5 %
GFR AFRICAN AMERICAN: 38
GFR NON-AFRICAN AMERICAN: 31
GLUCOSE BLD-MCNC: 155 MG/DL (ref 70–99)
GLUCOSE BLD-MCNC: 186 MG/DL (ref 70–99)
HCT VFR BLD CALC: 38.4 % (ref 40.5–52.5)
HEMOGLOBIN: 13.3 G/DL (ref 13.5–17.5)
LACTIC ACID, SEPSIS: 0.8 MMOL/L (ref 0.4–1.9)
LYMPHOCYTES ABSOLUTE: 1.4 K/UL (ref 1–5.1)
LYMPHOCYTES RELATIVE PERCENT: 11.1 %
MCH RBC QN AUTO: 31.3 PG (ref 26–34)
MCHC RBC AUTO-ENTMCNC: 34.6 G/DL (ref 31–36)
MCV RBC AUTO: 90.5 FL (ref 80–100)
MONOCYTES ABSOLUTE: 1 K/UL (ref 0–1.3)
MONOCYTES RELATIVE PERCENT: 7.9 %
NEUTROPHILS ABSOLUTE: 9.8 K/UL (ref 1.7–7.7)
NEUTROPHILS RELATIVE PERCENT: 76.8 %
PDW BLD-RTO: 13.1 % (ref 12.4–15.4)
PERFORMED ON: ABNORMAL
PLATELET # BLD: 278 K/UL (ref 135–450)
PMV BLD AUTO: 7.5 FL (ref 5–10.5)
POTASSIUM SERPL-SCNC: 4.1 MMOL/L (ref 3.5–5.1)
RBC # BLD: 4.24 M/UL (ref 4.2–5.9)
SODIUM BLD-SCNC: 142 MMOL/L (ref 136–145)
TOTAL PROTEIN: 7.4 G/DL (ref 6.4–8.2)
WBC # BLD: 12.8 K/UL (ref 4–11)

## 2022-04-26 PROCEDURE — 85025 COMPLETE CBC W/AUTO DIFF WBC: CPT

## 2022-04-26 PROCEDURE — 70450 CT HEAD/BRAIN W/O DYE: CPT

## 2022-04-26 PROCEDURE — 6370000000 HC RX 637 (ALT 250 FOR IP): Performed by: EMERGENCY MEDICINE

## 2022-04-26 PROCEDURE — 6370000000 HC RX 637 (ALT 250 FOR IP): Performed by: PHYSICIAN ASSISTANT

## 2022-04-26 PROCEDURE — 36415 COLL VENOUS BLD VENIPUNCTURE: CPT

## 2022-04-26 PROCEDURE — 80053 COMPREHEN METABOLIC PANEL: CPT

## 2022-04-26 PROCEDURE — 99284 EMERGENCY DEPT VISIT MOD MDM: CPT

## 2022-04-26 PROCEDURE — 83605 ASSAY OF LACTIC ACID: CPT

## 2022-04-26 PROCEDURE — 93005 ELECTROCARDIOGRAM TRACING: CPT | Performed by: PHYSICIAN ASSISTANT

## 2022-04-26 RX ORDER — CLONIDINE HYDROCHLORIDE 0.1 MG/1
0.1 TABLET ORAL ONCE
Status: COMPLETED | OUTPATIENT
Start: 2022-04-26 | End: 2022-04-26

## 2022-04-26 RX ORDER — HYDROCODONE BITARTRATE AND ACETAMINOPHEN 5; 325 MG/1; MG/1
2 TABLET ORAL ONCE
Status: COMPLETED | OUTPATIENT
Start: 2022-04-26 | End: 2022-04-26

## 2022-04-26 RX ORDER — ONDANSETRON 4 MG/1
4 TABLET, ORALLY DISINTEGRATING ORAL ONCE
Status: COMPLETED | OUTPATIENT
Start: 2022-04-26 | End: 2022-04-26

## 2022-04-26 RX ADMIN — HYDROCODONE BITARTRATE AND ACETAMINOPHEN 2 TABLET: 5; 325 TABLET ORAL at 22:56

## 2022-04-26 RX ADMIN — ONDANSETRON 4 MG: 4 TABLET, ORALLY DISINTEGRATING ORAL at 22:56

## 2022-04-26 RX ADMIN — CLONIDINE HYDROCHLORIDE 0.1 MG: 0.1 TABLET ORAL at 23:04

## 2022-04-26 ASSESSMENT — PAIN DESCRIPTION - ONSET: ONSET: GRADUAL

## 2022-04-26 ASSESSMENT — PAIN DESCRIPTION - PAIN TYPE: TYPE: ACUTE PAIN

## 2022-04-26 ASSESSMENT — ENCOUNTER SYMPTOMS
NAUSEA: 0
COUGH: 0
VOMITING: 0

## 2022-04-26 ASSESSMENT — PAIN - FUNCTIONAL ASSESSMENT: PAIN_FUNCTIONAL_ASSESSMENT: 0-10

## 2022-04-26 ASSESSMENT — PAIN DESCRIPTION - FREQUENCY: FREQUENCY: CONTINUOUS

## 2022-04-26 ASSESSMENT — PAIN DESCRIPTION - LOCATION: LOCATION: EAR

## 2022-04-26 ASSESSMENT — PAIN DESCRIPTION - DESCRIPTORS: DESCRIPTORS: SHARP

## 2022-04-26 ASSESSMENT — PAIN SCALES - GENERAL: PAINLEVEL_OUTOF10: 9

## 2022-04-26 ASSESSMENT — PAIN DESCRIPTION - ORIENTATION: ORIENTATION: LEFT

## 2022-04-27 VITALS
HEART RATE: 98 BPM | DIASTOLIC BLOOD PRESSURE: 100 MMHG | TEMPERATURE: 98.2 F | HEIGHT: 68 IN | BODY MASS INDEX: 26.55 KG/M2 | OXYGEN SATURATION: 99 % | RESPIRATION RATE: 16 BRPM | SYSTOLIC BLOOD PRESSURE: 174 MMHG | WEIGHT: 175.2 LBS

## 2022-04-27 LAB
EKG ATRIAL RATE: 98 BPM
EKG DIAGNOSIS: NORMAL
EKG P AXIS: 34 DEGREES
EKG P-R INTERVAL: 162 MS
EKG Q-T INTERVAL: 362 MS
EKG QRS DURATION: 80 MS
EKG QTC CALCULATION (BAZETT): 462 MS
EKG R AXIS: -14 DEGREES
EKG T AXIS: 47 DEGREES
EKG VENTRICULAR RATE: 98 BPM

## 2022-04-27 PROCEDURE — 93010 ELECTROCARDIOGRAM REPORT: CPT | Performed by: INTERNAL MEDICINE

## 2022-04-27 PROCEDURE — 6370000000 HC RX 637 (ALT 250 FOR IP): Performed by: PHYSICIAN ASSISTANT

## 2022-04-27 PROCEDURE — 6370000000 HC RX 637 (ALT 250 FOR IP): Performed by: EMERGENCY MEDICINE

## 2022-04-27 RX ORDER — OXYCODONE HYDROCHLORIDE AND ACETAMINOPHEN 5; 325 MG/1; MG/1
1 TABLET ORAL EVERY 4 HOURS PRN
Status: DISCONTINUED | OUTPATIENT
Start: 2022-04-27 | End: 2022-04-27 | Stop reason: HOSPADM

## 2022-04-27 RX ORDER — CIPROFLOXACIN 500 MG/1
500 TABLET, FILM COATED ORAL ONCE
Status: COMPLETED | OUTPATIENT
Start: 2022-04-27 | End: 2022-04-27

## 2022-04-27 RX ORDER — CIPROFLOXACIN 500 MG/1
500 TABLET, FILM COATED ORAL 2 TIMES DAILY
Qty: 14 TABLET | Refills: 0 | Status: SHIPPED | OUTPATIENT
Start: 2022-04-27 | End: 2022-05-04

## 2022-04-27 RX ORDER — DEXAMETHASONE SODIUM PHOSPHATE 1 MG/ML
4 SOLUTION/ DROPS OPHTHALMIC ONCE
Status: COMPLETED | OUTPATIENT
Start: 2022-04-27 | End: 2022-04-27

## 2022-04-27 RX ORDER — CIPROFLOXACIN AND DEXAMETHASONE 3; 1 MG/ML; MG/ML
4 SUSPENSION/ DROPS AURICULAR (OTIC) 2 TIMES DAILY
Qty: 7.5 ML | Refills: 0 | Status: SHIPPED | OUTPATIENT
Start: 2022-04-27 | End: 2022-05-02 | Stop reason: SDUPTHER

## 2022-04-27 RX ORDER — CIPROFLOXACIN HYDROCHLORIDE 3.5 MG/ML
4 SOLUTION/ DROPS TOPICAL ONCE
Status: COMPLETED | OUTPATIENT
Start: 2022-04-27 | End: 2022-04-27

## 2022-04-27 RX ORDER — CIPROFLOXACIN AND DEXAMETHASONE 3; 1 MG/ML; MG/ML
4 SUSPENSION/ DROPS AURICULAR (OTIC) ONCE
Status: DISCONTINUED | OUTPATIENT
Start: 2022-04-27 | End: 2022-04-27 | Stop reason: CLARIF

## 2022-04-27 RX ORDER — OXYCODONE HYDROCHLORIDE AND ACETAMINOPHEN 5; 325 MG/1; MG/1
1 TABLET ORAL EVERY 6 HOURS PRN
Qty: 12 TABLET | Refills: 0 | Status: SHIPPED | OUTPATIENT
Start: 2022-04-27 | End: 2022-04-30

## 2022-04-27 RX ADMIN — OXYCODONE AND ACETAMINOPHEN 1 TABLET: 5; 325 TABLET ORAL at 01:04

## 2022-04-27 RX ADMIN — DEXAMETHASONE SODIUM PHOSPHATE 4 DROP: 1 SOLUTION/ DROPS OPHTHALMIC at 01:05

## 2022-04-27 RX ADMIN — CIPROFLOXACIN 4 DROP: 3 SOLUTION OPHTHALMIC at 01:05

## 2022-04-27 RX ADMIN — CIPROFLOXACIN HYDROCHLORIDE 500 MG: 500 TABLET, FILM COATED ORAL at 01:08

## 2022-04-27 ASSESSMENT — PAIN SCALES - GENERAL: PAINLEVEL_OUTOF10: 8

## 2022-04-27 NOTE — ED PROVIDER NOTES
[facility]    Physician Attestation    Pt Name: Cal Finney  MRN: [id number]  [place] 1950  Date of evaluation: 4/26/22        Physician Note:    I havepersonally performed and/or participated in the history, exam and medical decision making and agree with all pertinent clinical information. I have also reviewed and agree with the past medical, family and social historyunless otherwise noted. I have personally performed a face to face diagnostic evaluation onthis patient. I have reviewed the mid-levels findings and agree. History: Patient complains of left ear pain with swelling for 24 hours no fever he is diabetic and has poorly controlled hypertension complains of left ear pain moderately severe      REVIEW OF SYSTEMS    Constitutional:  Denies fever, chills, or weakness   Eyes:  Denies vision changes  HENT:  Denies sore throat or neck pain   Respiratory:  Denies cough or shortness of breath   Cardiovascular:  Denies chest pain  GI:  Denies abdominal pain, nausea, vomiting, or diarrhea   Musculoskeletal:  Denies back pain   Skin: no rash or vesicles   Neurologic:  no headache weakness focal    Lymphatic:  no swollen  nodes   Psychiatric: no si or hs thoughts     All systems negative except as marked. General Appearance:  Alert, cooperative, no distress, appears stated age. Head:  Normocephalic, without obvious abnormality, atraumatic. Eyes:  conjunctiva/corneas clear, EOM's intact. Sclera anicteric. ENT: Mucous membranes moist.  Positive tragus with swollen left ear canal some pus is present the external ear itself is not swollen except for slight amount no erythema no adenopathy does not look like a malignant otitis externa   Neck: Supple, symmetrical, trachea midline, no adenopathy. No jugular venous distention. Lungs:   No Respiratory Distress. no rales  rhonchi rub   Chest Wall:  Nontender  no deformity

## 2022-04-27 NOTE — ED PROVIDER NOTES
[address] [facility]        Pt Name: Caro Hernandez  MRN: [id number]  [place] 1950  Date of evaluation: 4/26/2022  Provider: James Almazan PA-C  PCP: Referring Not In System (Inactive)  Note Started: 11:05 PM EDT        I have seen and evaluated this patient with my supervising physician Krystle Oscar MD.    [address] [place] [address]       Chief Complaint   Patient presents with   Geradine Man     pt states left ear pain since yesterday, thought it was from his teeth. pt states PCP told him today its not his teeth, thinks he has a clogged tube in his ear. HISTORY OF PRESENT ILLNESS   (Location, Timing/Onset, Context/Setting, Quality, Duration, Modifying Factors, Severity, Associated Signs and Symptoms)  Note limiting factors. Chief Complaint: Left ear pain    Caro Hernandez is a 80 y.o. male who presents to the emergency department today for evaluation for left ear pain. The patient has been experiencing pain to his left ear, which started yesterday. The patient states that he went to the dentist this morning as he thought that this could be a tooth ache, and he states that he was told that his teeth were normal.  Patient states that he has had severe pain to his left ear, which is constant, and is currently rated as a 9/10. He otherwise denies any known alleviating or aggravating factors. Patient has not had any drainage to the ear. He states that he is a diabetic. Patient denies any fever chills. No nausea or vomiting. No recent cough or congestion, patient otherwise has no other complaints    Nursing Notes were all reviewed and agreed with or any disagreements were addressed in the HPI. REVIEW OF SYSTEMS    (2-9 systems for level 4, 10 or more for level 5)     Review of Systems   Constitutional: Negative for activity change, appetite change and fever. HENT: Positive for ear pain. Negative for congestion and ear discharge. Respiratory: Negative for cough. Gastrointestinal: Negative for nausea and vomiting. Neurological: Negative for weakness, numbness and headaches. Positives and Pertinent negatives as per HPI. Except as noted above in the ROS, all other systems were reviewed and negative. PAST MEDICAL HISTORY     Past Medical History:   Diagnosis Date    Diabetes mellitus ([address]     Hypertension          SURGICAL HISTORY     Past Surgical History:   Procedure Laterality Date    ANKLE FRACTURE SURGERY      TOTAL HIP ARTHROPLASTY Left 2/25/2022    LEFT TOTAL HIP ARTHROPLASTY, ANTERIOR APPROACH (Patricia Blew) performed by Terry Buchanan MD at [address] [place] [address]       Previous Medications    AMLODIPINE (NORVASC) 5 MG TABLET    Take 2 tablets by mouth daily    ASPIRIN EC 81 MG EC TABLET    Take 1 tablet by mouth 2 times daily for 28 days After 28 days resume home dose of daily ASA    ATORVASTATIN (LIPITOR) 20 MG TABLET    Take 20 mg by mouth daily. CONTINUOUS BLOOD GLUC SENSOR (FREESTYLE SHAINA 14 DAY SENSOR) MISC    by Does not apply route    GLIMEPIRIDE (AMARYL) 2 MG TABLET    Take 4 mg by mouth every morning (before breakfast)     SITAGLIPTIN-METFORMIN HCL ER (JANUMET XR) 100-1000 MG TB24    Take 100-1,000 mg by mouth daily    TAMSULOSIN (FLOMAX) 0.4 MG CAPSULE    Take 1 capsule by mouth nightly         ALLERGIES     Patient has no known allergies. FAMILYHISTORY     History reviewed. No pertinent family history. SOCIAL HISTORY       Social History     Tobacco Use    Smoking status: Never Smoker    Smokeless tobacco: Never Used   Substance Use Topics    Alcohol use:  Yes     Alcohol/week: 1.0 standard drink     Types: 1 Glasses of wine per week    Drug use: Not on file       SCREENINGS             PHYSICAL EXAM    (up to 7 for level 4, 8 or more for level 5)     ED Triage Vitals [04/26/22 2239]   BP Temp Temp Source Pulse Resp SpO2 Height Weight   (!) 230/105 98.2 °F (36.8 °C) Oral 98 16 99 % 5' 8\" (1.727 m) 175 lb 3.2 oz (79.5 kg)       Physical Exam  Vitals and nursing note reviewed. Constitutional:       Appearance: He is well-developed. He is not diaphoretic. HENT:      Head: Normocephalic and atraumatic. Right Ear: Tympanic membrane and external ear normal.      Left Ear: External ear normal.      Ears:      Comments: Left TM is partially obscured. What is visible appears to be normal.  The ear canal is edematous, erythematous, with a purulent debris noted. Patient has pain with exam, and with manipulation of tragus and pinna. Nose: Nose normal.      Mouth/Throat:      Mouth: Mucous membranes are moist.      Pharynx: Oropharynx is clear. No posterior oropharyngeal erythema. Eyes:      General:         Right eye: No discharge. Left eye: No discharge. Neck:      Trachea: No tracheal deviation. Pulmonary:      Effort: Pulmonary effort is normal. No respiratory distress. Musculoskeletal:         General: Normal range of motion. Cervical back: Normal range of motion and neck supple. Skin:     General: Skin is warm and dry. Neurological:      Mental Status: He is alert and oriented to person, place, and time.    Psychiatric:         Behavior: Behavior normal.         DIAGNOSTIC RESULTS   LABS:    Labs Reviewed   CBC WITH AUTO DIFFERENTIAL - Abnormal; Notable for the following components:       Result Value    WBC 12.8 (*)     Hemoglobin 13.3 (*)     Hematocrit 38.4 (*)     Neutrophils Absolute 9.8 (*)     All other components within normal limits   COMPREHENSIVE METABOLIC PANEL - Abnormal; Notable for the following components:    Glucose 186 (*)     BUN 25 (*)     CREATININE 2.1 (*)     GFR Non- 31 (*)     GFR  38 (*)     Albumin 5.1 (*)     Alkaline Phosphatase 145 (*)     All other components within normal limits   POCT GLUCOSE - Abnormal; Notable for the following components:    POC Glucose 155 (*)     All other components within normal limits   LACTATE, SEPSIS   LACTATE, SEPSIS   POCT GLUCOSE       When ordered only abnormal lab results are displayed. All other labs were within normal range or not returned as of this dictation. EKG: When ordered, EKG's are interpreted by the Emergency Department Physician in the absence of a cardiologist.  Please see their note for interpretation of EKG. RADIOLOGY:   Non-plain film images such as CT, Ultrasound and MRI are read by the radiologist. Plain radiographic images are visualized and preliminarily interpreted by the ED Provider with the below findings:        Interpretation per the Radiologist below, if available at the time of this note:    CT HEAD WO CONTRAST   Final Result   No acute intracranial abnormality. Middle ears are aerated bilaterally. Few opacified air cells at the right mastoid tip. Correlate clinically for   acute infection. RECOMMENDATIONS:   Unavailable           No results found.         PROCEDURES   Unless otherwise noted below, none     Procedures    CRITICAL CARE TIME       CONSULTS:  None      EMERGENCY DEPARTMENT COURSE and DIFFERENTIAL DIAGNOSIS/MDM:   Vitals:    Vitals:    04/26/22 2239 04/27/22 0107   BP: (!) 230/105 (!) 174/100   Pulse: 98    Resp: 16    Temp: 98.2 °F (36.8 °C)    TempSrc: Oral    SpO2: 99%    Weight: 175 lb 3.2 oz (79.5 kg)    Height: 5' 8\" (1.727 m)        Patient was given the following medications:  Medications   oxyCODONE-acetaminophen (PERCOCET) 5-325 MG per tablet 1 tablet (1 tablet Oral Given 4/27/22 0104)   HYDROcodone-acetaminophen (NORCO) 5-325 MG per tablet 2 tablet (2 tablets Oral Given 4/26/22 2256)   ondansetron (ZOFRAN-ODT) disintegrating tablet 4 mg (4 mg Oral Given 4/26/22 2256)   cloNIDine (CATAPRES) tablet 0.1 mg (0.1 mg Oral Given 4/26/22 2304)   ciprofloxacin (CIPRO) tablet 500 mg (500 mg Oral Given 4/27/22 0108)   ciprofloxacin (CILOXAN) 0.3 % ophthalmic solution 4 drop (4 drops Left Ear Given 4/27/22 0105)     And   dexamethasone (DECADRON) 0.1 % ophthalmic solution 4 drop (4 drops Left Ear Given 4/27/22 0105)           Briefly, this is a 71-year-old male who presents to the emergency department today for evaluation for left ear pain. Patient states that this started yesterday. He states that the pain is worsened, he initially thought that this could be a 2-day, he went to his dentist today and was told that his teeth are normal.    Patient is a diabetic, hypertensive. On examination he has erythema, and edema noted to the ear canal, with a white debris noted    Due to the patient's age, history of diabetes, lab work and CT will be obtained    Has leukocytosis of 12.8 and there is no evidence of anemia. CMP does show chronic kidney disease otherwise is unremarkable. Lactic acid is normal.  CT of head shows no acute process. The middle ears are aerated bilaterally. Patient will be given Ciprodex here, pain management as well as ciprofloxacin for concern for otitis externa. As the patient is a diabetic, he will be given oral Cipro, and Ciprodex drops for home. He was referred to ENT for follow-up within 2 to 3 days. There is no signs of any osteomyelitis or other etiology at this time and therefore he can be managed as an outpatient. Strict return precautions given. Stable for discharge. Suspicion is low at this time for perforation, otitis media, foreign body, osteomyelitis, or other emergent etiology    FINAL IMPRESSION      1.  Infective otitis externa of left ear          DISPOSITION/PLAN   DISPOSITION Decision To Discharge 04/27/2022 01:11:04 AM      PATIENT REFERRED TO:  Silver Correa DO  [address] [place] [address] [place] [address] [phone]    Schedule an appointment as soon as possible for a visit in 2 days      [facility] Emergency Department  [address] [facility]  [phone]    As needed, If symptoms worsen      DISCHARGE MEDICATIONS:  New Prescriptions CIPROFLOXACIN (CIPRO) 500 MG TABLET    Take 1 tablet by mouth 2 times daily for 7 days    CIPROFLOXACIN-DEXAMETHASONE (CIPRODEX) 0.3-0.1 % OTIC SUSPENSION    Place 4 drops in ear(s) 2 times daily for 7 days    OXYCODONE-ACETAMINOPHEN (PERCOCET) 5-325 MG PER TABLET    Take 1 tablet by mouth every 6 hours as needed for Pain for up to 3 days. Intended supply: 3 days.  Take lowest dose possible to manage pain       DISCONTINUED MEDICATIONS:  Discontinued Medications    No medications on file              (Please note that portions of this note were completed with a voice recognition program.  Efforts were made to edit the dictations but occasionally words are mis-transcribed.)    Castro Irving PA-C (electronically signed)            Castro Irving PA-C  04/27/22 0124

## 2022-05-02 ENCOUNTER — OFFICE VISIT (OUTPATIENT)
Dept: ENT CLINIC | Age: 72
End: 2022-05-02
Payer: COMMERCIAL

## 2022-05-02 VITALS
WEIGHT: 173 LBS | BODY MASS INDEX: 26.22 KG/M2 | HEIGHT: 68 IN | HEART RATE: 108 BPM | DIASTOLIC BLOOD PRESSURE: 80 MMHG | OXYGEN SATURATION: 99 % | SYSTOLIC BLOOD PRESSURE: 148 MMHG | RESPIRATION RATE: 16 BRPM

## 2022-05-02 DIAGNOSIS — H60.392 OTHER INFECTIVE ACUTE OTITIS EXTERNA OF LEFT EAR: ICD-10-CM

## 2022-05-02 DIAGNOSIS — H61.22 IMPACTED CERUMEN OF LEFT EAR: ICD-10-CM

## 2022-05-02 DIAGNOSIS — R42 DIZZINESS: Primary | ICD-10-CM

## 2022-05-02 DIAGNOSIS — H91.92 HEARING LOSS OF LEFT EAR, UNSPECIFIED HEARING LOSS TYPE: ICD-10-CM

## 2022-05-02 DIAGNOSIS — H93.8X2 SENSATION OF FULLNESS IN LEFT EAR: ICD-10-CM

## 2022-05-02 DIAGNOSIS — H92.02 LEFT EAR PAIN: ICD-10-CM

## 2022-05-02 PROCEDURE — 69210 REMOVE IMPACTED EAR WAX UNI: CPT | Performed by: STUDENT IN AN ORGANIZED HEALTH CARE EDUCATION/TRAINING PROGRAM

## 2022-05-02 PROCEDURE — 99203 OFFICE O/P NEW LOW 30 MIN: CPT | Performed by: STUDENT IN AN ORGANIZED HEALTH CARE EDUCATION/TRAINING PROGRAM

## 2022-05-02 RX ORDER — CIPROFLOXACIN AND DEXAMETHASONE 3; 1 MG/ML; MG/ML
4 SUSPENSION/ DROPS AURICULAR (OTIC) 2 TIMES DAILY
Qty: 7.5 ML | Refills: 0 | Status: SHIPPED | OUTPATIENT
Start: 2022-05-02 | End: 2022-05-09

## 2022-05-02 NOTE — PROGRESS NOTES
[address] [facility] (:  1950) is a 80 y.o. male, here for evaluation of the following chief complaint(s):  Otalgia (left ear) and Tinnitus      ASSESSMENT/PLAN:  1. Dizziness  2. Sensation of fullness in left ear  3. Left ear pain  4. Other infective acute otitis externa of left ear  5. Hearing loss of left ear, unspecified hearing loss type      This is a very pleasant 80 y.o. male here today for evaluation of the the above-noted complaints.      -Left ear debrided today. There is still evidence of some resolving infection of the left EAC. There is no evidence of underlying myringitis or tympanic membrane perforation.  -Start Ciprodex drops again. Patient previously stopped these, however there is still an infectious component that I want him to treat this with.  -Discussed that pain after debridement is normal.  In the setting of the inflammation and sloughing of skin the ear can be exquisitely tender. Asked him to trial Tylenol and ibuprofen for pain. We discussed that narcotics are not indicated for his pain at this time  -Discussed dry ear precautions.  -Discussed with the patient that he is at increased risk of developing malignant otitis externa due to his history of diabetes. Have asked patient to monitor his blood sugars consistently and ensure that they are under good control.  -We will watch the dizziness and hearing loss. We can address these if they are persistent after his infection is treated. Medical Decision Making: The following items were considered in medical decision making:  Independent review of images  Review / order clinical lab tests  Review / order radiology tests  Decision to obtain old records  Review and summation [facility]    SUBJECTIVE/OBJECTIVE:  CARIDAD    Lamonte Cooks is here today for evaluation of issues related to his left ear.   The patient states about a week ago he was seen in the emergency department for severe left ear pain, purulent otorrhea, foul-smelling discharge, decreased hearing loss and dizziness. They placed an otowick at that time and put him on oral antibiotics and antibiotic drops. Since that time he reports his pain is much improved. His drainage has decreased. Denies a history of ear infections the past.  He does have some hearing loss and occasional dizziness. He denies true vertigo          REVIEW OF SYSTEMS  The following systems were reviewed and revealed the following in addition to any already discussed in the HPI:    PHYSICAL EXAM    GENERAL: No acute distress, alert and oriented, no hoarseness, strong voice  EYES: EOMI, Anti-icteric  HENT:   Head: Normocephalic and atraumatic. Face:  Symmetric, facial nerve intact  Mouth/Oral Cavity:  normal lips, Uvula is midline, no mucosal lesions, no trismus, fair dentition, normal salivary quality/flow  Nose:Normal external nasal appearance. NECK: Normal range of motion, no thyromegaly, trachea is midline, no lymphadenopathy, no neck masses, no crepitus      PROCEDURE    Use of Operating Microscope and Cerumen Removal CPT code 69210-left  Indications: On the left cerumen impaction obstructing visualization of the tympanic membrane(s). An operating microscope was utilized to visualize the external auditory canals using a speculum. On the left there is a mixture of purulent debris mixed with cerumen that was suctioned. There is no evidence of underlying abscess of the external auditory canal.  There is no evidence of myringitis. This note was generated completely or in part utilizing Dragon dictation speech recognition software. Occasionally, words are mistranscribed and despite editing, the text may contain inaccuracies due to incorrect word recognition. If further clarification is needed please contact the office at [phone].     An electronic signature was used to authenticate this note.     --Maria De Jesus Viera MD

## 2023-08-10 DIAGNOSIS — I10 ESSENTIAL HYPERTENSION: ICD-10-CM

## 2023-08-10 DIAGNOSIS — E11.22 TYPE 2 DIABETES MELLITUS WITH STAGE 3 CHRONIC KIDNEY DISEASE AND HYPERTENSION (HCC): ICD-10-CM

## 2023-08-10 DIAGNOSIS — N18.30 TYPE 2 DIABETES MELLITUS WITH STAGE 3 CHRONIC KIDNEY DISEASE AND HYPERTENSION (HCC): ICD-10-CM

## 2023-08-10 DIAGNOSIS — N18.32 CHRONIC KIDNEY DISEASE, STAGE 3B (HCC): ICD-10-CM

## 2023-08-10 DIAGNOSIS — I12.9 TYPE 2 DIABETES MELLITUS WITH STAGE 3 CHRONIC KIDNEY DISEASE AND HYPERTENSION (HCC): ICD-10-CM

## 2023-08-10 LAB
25(OH)D3 SERPL-MCNC: 28.2 NG/ML
ALBUMIN SERPL-MCNC: 4 G/DL (ref 3.4–5)
ANION GAP SERPL CALCULATED.3IONS-SCNC: 14 MMOL/L (ref 3–16)
BUN SERPL-MCNC: 26 MG/DL (ref 7–20)
CALCIUM SERPL-MCNC: 9 MG/DL (ref 8.3–10.6)
CHLORIDE SERPL-SCNC: 101 MMOL/L (ref 99–110)
CO2 SERPL-SCNC: 23 MMOL/L (ref 21–32)
CREAT SERPL-MCNC: 3 MG/DL (ref 0.8–1.3)
CREAT UR-MCNC: 64.6 MG/DL (ref 39–259)
DEPRECATED RDW RBC AUTO: 13.1 % (ref 12.4–15.4)
GFR SERPLBLD CREATININE-BSD FMLA CKD-EPI: 21 ML/MIN/{1.73_M2}
GLUCOSE SERPL-MCNC: 337 MG/DL (ref 70–99)
HCT VFR BLD AUTO: 35.6 % (ref 40.5–52.5)
HGB BLD-MCNC: 12.3 G/DL (ref 13.5–17.5)
MCH RBC QN AUTO: 31.1 PG (ref 26–34)
MCHC RBC AUTO-ENTMCNC: 34.4 G/DL (ref 31–36)
MCV RBC AUTO: 90.3 FL (ref 80–100)
MICROALBUMIN UR DL<=1MG/L-MCNC: 54 MG/DL
MICROALBUMIN/CREAT UR: 835.9 MG/G (ref 0–30)
PHOSPHATE SERPL-MCNC: 4.8 MG/DL (ref 2.5–4.9)
PLATELET # BLD AUTO: 243 K/UL (ref 135–450)
PMV BLD AUTO: 8.8 FL (ref 5–10.5)
POTASSIUM SERPL-SCNC: 3.6 MMOL/L (ref 3.5–5.1)
PROT UR-MCNC: 83 MG/DL
PROT/CREAT UR-RTO: 1.3 MG/DL
PTH-INTACT SERPL-MCNC: 89.1 PG/ML (ref 14–72)
RBC # BLD AUTO: 3.94 M/UL (ref 4.2–5.9)
SODIUM SERPL-SCNC: 138 MMOL/L (ref 136–145)
WBC # BLD AUTO: 9.6 K/UL (ref 4–11)

## 2023-09-12 DIAGNOSIS — N18.31 STAGE 3A CHRONIC KIDNEY DISEASE (HCC): ICD-10-CM

## 2023-09-12 DIAGNOSIS — R80.9 PROTEINURIA, UNSPECIFIED TYPE: ICD-10-CM

## 2023-09-12 DIAGNOSIS — N18.30 STAGE 3 CHRONIC KIDNEY DISEASE, UNSPECIFIED WHETHER STAGE 3A OR 3B CKD (HCC): ICD-10-CM

## 2023-09-12 LAB
COLLECT DURATION TIME UR: 24 HR
CREAT 24H UR-MRATE: 1 G/24HR (ref 0.6–2.5)
CREAT CL 24H UR+SERPL-VRATE: 190 ML/MIN (ref 100–140)
CREAT SERPL-MCNC: 2.3 MG/DL (ref 0.8–1.3)
MICROALBUMIN 24H UR-MRATE: 703.5 MG/DAY (ref 0–30)
MICROALBUMIN UG/MIN 24H UR-MRATE: 488.5 MCG/MIN (ref 0–20)
PROT 24H UR-MRATE: 1.23 G/24HR
SPECIMEN VOL 24H UR: 1500 ML

## 2023-09-13 LAB
ANA SER QL IA: NEGATIVE
ANION GAP SERPL CALCULATED.3IONS-SCNC: 13 MMOL/L (ref 3–16)
BUN SERPL-MCNC: 21 MG/DL (ref 7–20)
CALCIUM SERPL-MCNC: 8.6 MG/DL (ref 8.3–10.6)
CHLORIDE SERPL-SCNC: 103 MMOL/L (ref 99–110)
CO2 SERPL-SCNC: 24 MMOL/L (ref 21–32)
CREAT SERPL-MCNC: 2.5 MG/DL (ref 0.8–1.3)
DSDNA AB SER-ACNC: <1 IU/ML (ref 0–9)
GFR SERPLBLD CREATININE-BSD FMLA CKD-EPI: 27 ML/MIN/{1.73_M2}
GLUCOSE SERPL-MCNC: 343 MG/DL (ref 70–99)
POTASSIUM SERPL-SCNC: 3.3 MMOL/L (ref 3.5–5.1)
SODIUM SERPL-SCNC: 140 MMOL/L (ref 136–145)

## 2023-09-13 NOTE — RESULT ENCOUNTER NOTE
Called patient to inform results of repeat labs   Left VM to contact office  It pt returns call please inform kidney function improved so continue changes from last office visit     Also advise to follow potassium rich diet    Needs to get repeat labs closer to appt next month

## 2023-09-14 LAB — PROT PATTERN UR ELPH-IMP: NORMAL

## 2023-11-21 DIAGNOSIS — N18.9 ACUTE KIDNEY INJURY SUPERIMPOSED ON CHRONIC KIDNEY DISEASE (HCC): ICD-10-CM

## 2023-11-21 DIAGNOSIS — N18.30 TYPE 2 DIABETES MELLITUS WITH STAGE 3 CHRONIC KIDNEY DISEASE AND HYPERTENSION (HCC): ICD-10-CM

## 2023-11-21 DIAGNOSIS — N18.31 STAGE 3A CHRONIC KIDNEY DISEASE (HCC): ICD-10-CM

## 2023-11-21 DIAGNOSIS — N40.0 BENIGN PROSTATIC HYPERPLASIA WITHOUT LOWER URINARY TRACT SYMPTOMS: ICD-10-CM

## 2023-11-21 DIAGNOSIS — R80.9 PROTEINURIA, UNSPECIFIED TYPE: ICD-10-CM

## 2023-11-21 DIAGNOSIS — N18.32 STAGE 3B CHRONIC KIDNEY DISEASE (HCC): ICD-10-CM

## 2023-11-21 DIAGNOSIS — R80.8 OTHER PROTEINURIA: ICD-10-CM

## 2023-11-21 DIAGNOSIS — I12.9 TYPE 2 DIABETES MELLITUS WITH STAGE 3 CHRONIC KIDNEY DISEASE AND HYPERTENSION (HCC): ICD-10-CM

## 2023-11-21 DIAGNOSIS — N17.9 ACUTE KIDNEY INJURY SUPERIMPOSED ON CHRONIC KIDNEY DISEASE (HCC): ICD-10-CM

## 2023-11-21 DIAGNOSIS — E11.22 TYPE 2 DIABETES MELLITUS WITH STAGE 3 CHRONIC KIDNEY DISEASE AND HYPERTENSION (HCC): ICD-10-CM

## 2023-11-21 LAB
25(OH)D3 SERPL-MCNC: 20.8 NG/ML
ALBUMIN SERPL-MCNC: 4.1 G/DL (ref 3.4–5)
ANION GAP SERPL CALCULATED.3IONS-SCNC: 11 MMOL/L (ref 3–16)
BUN SERPL-MCNC: 19 MG/DL (ref 7–20)
C3 SERPL-MCNC: 131.7 MG/DL (ref 90–180)
C4 SERPL-MCNC: 22.6 MG/DL (ref 10–40)
CALCIUM SERPL-MCNC: 9.1 MG/DL (ref 8.3–10.6)
CHLORIDE SERPL-SCNC: 101 MMOL/L (ref 99–110)
CO2 SERPL-SCNC: 27 MMOL/L (ref 21–32)
CREAT SERPL-MCNC: 2.6 MG/DL (ref 0.8–1.3)
DEPRECATED RDW RBC AUTO: 13 % (ref 12.4–15.4)
GFR SERPLBLD CREATININE-BSD FMLA CKD-EPI: 25 ML/MIN/{1.73_M2}
GLUCOSE SERPL-MCNC: 350 MG/DL (ref 70–99)
HCT VFR BLD AUTO: 34.3 % (ref 40.5–52.5)
HGB BLD-MCNC: 11.7 G/DL (ref 13.5–17.5)
MCH RBC QN AUTO: 30.8 PG (ref 26–34)
MCHC RBC AUTO-ENTMCNC: 34.1 G/DL (ref 31–36)
MCV RBC AUTO: 90.4 FL (ref 80–100)
PHOSPHATE SERPL-MCNC: 3.7 MG/DL (ref 2.5–4.9)
PLATELET # BLD AUTO: 235 K/UL (ref 135–450)
PMV BLD AUTO: 8.8 FL (ref 5–10.5)
POTASSIUM SERPL-SCNC: 3.3 MMOL/L (ref 3.5–5.1)
RBC # BLD AUTO: 3.8 M/UL (ref 4.2–5.9)
SODIUM SERPL-SCNC: 139 MMOL/L (ref 136–145)
WBC # BLD AUTO: 6.8 K/UL (ref 4–11)

## 2023-11-22 LAB
ANA SER QL IA: NEGATIVE
CREAT UR-MCNC: 112.7 MG/DL (ref 39–259)
CREAT UR-MCNC: 115.8 MG/DL (ref 39–259)
MICROALBUMIN UR DL<=1MG/L-MCNC: 72.7 MG/DL
MICROALBUMIN/CREAT UR: 645.1 MG/G (ref 0–30)
PROT UR-MCNC: 145 MG/DL
PROT/CREAT UR-RTO: 1.3 MG/DL

## 2024-02-21 DIAGNOSIS — N40.0 BENIGN PROSTATIC HYPERPLASIA WITHOUT LOWER URINARY TRACT SYMPTOMS: ICD-10-CM

## 2024-02-21 DIAGNOSIS — N18.9 ANEMIA IN CHRONIC KIDNEY DISEASE, UNSPECIFIED CKD STAGE: ICD-10-CM

## 2024-02-21 DIAGNOSIS — N18.30 TYPE 2 DIABETES MELLITUS WITH STAGE 3 CHRONIC KIDNEY DISEASE AND HYPERTENSION (HCC): ICD-10-CM

## 2024-02-21 DIAGNOSIS — E55.9 VITAMIN D DEFICIENCY: ICD-10-CM

## 2024-02-21 DIAGNOSIS — E11.22 TYPE 2 DIABETES MELLITUS WITH STAGE 3 CHRONIC KIDNEY DISEASE AND HYPERTENSION (HCC): ICD-10-CM

## 2024-02-21 DIAGNOSIS — D63.1 ANEMIA IN CHRONIC KIDNEY DISEASE, UNSPECIFIED CKD STAGE: ICD-10-CM

## 2024-02-21 DIAGNOSIS — R80.8 OTHER PROTEINURIA: ICD-10-CM

## 2024-02-21 DIAGNOSIS — N18.9 ACUTE KIDNEY INJURY SUPERIMPOSED ON CHRONIC KIDNEY DISEASE (HCC): ICD-10-CM

## 2024-02-21 DIAGNOSIS — N17.9 ACUTE KIDNEY INJURY SUPERIMPOSED ON CHRONIC KIDNEY DISEASE (HCC): ICD-10-CM

## 2024-02-21 DIAGNOSIS — R80.9 PROTEINURIA, UNSPECIFIED TYPE: ICD-10-CM

## 2024-02-21 DIAGNOSIS — I12.9 TYPE 2 DIABETES MELLITUS WITH STAGE 3 CHRONIC KIDNEY DISEASE AND HYPERTENSION (HCC): ICD-10-CM

## 2024-02-21 DIAGNOSIS — N18.32 STAGE 3B CHRONIC KIDNEY DISEASE (HCC): ICD-10-CM

## 2024-02-21 DIAGNOSIS — N18.4 CHRONIC KIDNEY DISEASE, STAGE 4 (SEVERE) (HCC): ICD-10-CM

## 2024-02-21 DIAGNOSIS — E87.6 HYPOKALEMIA: ICD-10-CM

## 2024-02-21 LAB
25(OH)D3 SERPL-MCNC: 49.7 NG/ML
ALBUMIN SERPL-MCNC: 4.2 G/DL (ref 3.4–5)
ANION GAP SERPL CALCULATED.3IONS-SCNC: 11 MMOL/L (ref 3–16)
BUN SERPL-MCNC: 49 MG/DL (ref 7–20)
CALCIUM SERPL-MCNC: 8.5 MG/DL (ref 8.3–10.6)
CHLORIDE SERPL-SCNC: 108 MMOL/L (ref 99–110)
CO2 SERPL-SCNC: 24 MMOL/L (ref 21–32)
CREAT SERPL-MCNC: 3.4 MG/DL (ref 0.8–1.3)
CREAT UR-MCNC: 162 MG/DL (ref 39–259)
CREAT UR-MCNC: 163.6 MG/DL (ref 39–259)
DEPRECATED RDW RBC AUTO: 14.1 % (ref 12.4–15.4)
GFR SERPLBLD CREATININE-BSD FMLA CKD-EPI: 18 ML/MIN/{1.73_M2}
GLUCOSE SERPL-MCNC: 276 MG/DL (ref 70–99)
HCT VFR BLD AUTO: 32.9 % (ref 40.5–52.5)
HGB BLD-MCNC: 11.3 G/DL (ref 13.5–17.5)
MCH RBC QN AUTO: 31.3 PG (ref 26–34)
MCHC RBC AUTO-ENTMCNC: 34.4 G/DL (ref 31–36)
MCV RBC AUTO: 90.8 FL (ref 80–100)
MICROALBUMIN UR DL<=1MG/L-MCNC: 56.7 MG/DL
MICROALBUMIN/CREAT UR: 350 MG/G (ref 0–30)
PHOSPHATE SERPL-MCNC: 4.8 MG/DL (ref 2.5–4.9)
PLATELET # BLD AUTO: 264 K/UL (ref 135–450)
PMV BLD AUTO: 8.3 FL (ref 5–10.5)
POTASSIUM SERPL-SCNC: 4.6 MMOL/L (ref 3.5–5.1)
PROT UR-MCNC: 111 MG/DL
PROT/CREAT UR-RTO: 0.7 MG/DL
PTH-INTACT SERPL-MCNC: 128.5 PG/ML (ref 14–72)
RBC # BLD AUTO: 3.62 M/UL (ref 4.2–5.9)
SODIUM SERPL-SCNC: 143 MMOL/L (ref 136–145)
WBC # BLD AUTO: 8.5 K/UL (ref 4–11)

## 2024-02-22 LAB
EST. AVERAGE GLUCOSE BLD GHB EST-MCNC: 145.6 MG/DL
HBA1C MFR BLD: 6.7 %

## 2024-02-26 DIAGNOSIS — N17.9 ACUTE KIDNEY INJURY SUPERIMPOSED ON CKD (HCC): ICD-10-CM

## 2024-02-26 DIAGNOSIS — N18.4 CHRONIC KIDNEY DISEASE, STAGE 4 (SEVERE) (HCC): ICD-10-CM

## 2024-02-26 DIAGNOSIS — N18.9 ANEMIA IN CHRONIC KIDNEY DISEASE, UNSPECIFIED CKD STAGE: ICD-10-CM

## 2024-02-26 DIAGNOSIS — E55.9 VITAMIN D DEFICIENCY: ICD-10-CM

## 2024-02-26 DIAGNOSIS — N40.0 BENIGN PROSTATIC HYPERPLASIA WITHOUT LOWER URINARY TRACT SYMPTOMS: ICD-10-CM

## 2024-02-26 DIAGNOSIS — I10 ESSENTIAL HYPERTENSION: ICD-10-CM

## 2024-02-26 DIAGNOSIS — E87.6 HYPOKALEMIA: ICD-10-CM

## 2024-02-26 DIAGNOSIS — I12.9 TYPE 2 DIABETES MELLITUS WITH STAGE 3 CHRONIC KIDNEY DISEASE AND HYPERTENSION (HCC): ICD-10-CM

## 2024-02-26 DIAGNOSIS — R80.9 PROTEINURIA, UNSPECIFIED TYPE: ICD-10-CM

## 2024-02-26 DIAGNOSIS — D63.1 ANEMIA IN CHRONIC KIDNEY DISEASE, UNSPECIFIED CKD STAGE: ICD-10-CM

## 2024-02-26 DIAGNOSIS — N18.9 ACUTE KIDNEY INJURY SUPERIMPOSED ON CKD (HCC): ICD-10-CM

## 2024-02-26 DIAGNOSIS — E11.22 TYPE 2 DIABETES MELLITUS WITH STAGE 3 CHRONIC KIDNEY DISEASE AND HYPERTENSION (HCC): ICD-10-CM

## 2024-02-26 DIAGNOSIS — N18.30 TYPE 2 DIABETES MELLITUS WITH STAGE 3 CHRONIC KIDNEY DISEASE AND HYPERTENSION (HCC): ICD-10-CM

## 2024-02-26 LAB
ALBUMIN SERPL-MCNC: 4.5 G/DL (ref 3.4–5)
ANION GAP SERPL CALCULATED.3IONS-SCNC: 14 MMOL/L (ref 3–16)
BACTERIA URNS QL MICRO: ABNORMAL /HPF
BILIRUB UR QL STRIP.AUTO: NEGATIVE
BUN SERPL-MCNC: 44 MG/DL (ref 7–20)
CALCIUM SERPL-MCNC: 9.1 MG/DL (ref 8.3–10.6)
CHLORIDE SERPL-SCNC: 109 MMOL/L (ref 99–110)
CK SERPL-CCNC: 66 U/L (ref 39–308)
CLARITY UR: CLEAR
CO2 SERPL-SCNC: 22 MMOL/L (ref 21–32)
COLOR UR: YELLOW
CREAT SERPL-MCNC: 2.9 MG/DL (ref 0.8–1.3)
CREAT UR-MCNC: 102.9 MG/DL (ref 39–259)
DEPRECATED RDW RBC AUTO: 13.9 % (ref 12.4–15.4)
EPI CELLS #/AREA URNS AUTO: 1 /HPF (ref 0–5)
GFR SERPLBLD CREATININE-BSD FMLA CKD-EPI: 22 ML/MIN/{1.73_M2}
GLUCOSE SERPL-MCNC: 137 MG/DL (ref 70–99)
GLUCOSE UR STRIP.AUTO-MCNC: 100 MG/DL
HCT VFR BLD AUTO: 35.1 % (ref 40.5–52.5)
HGB BLD-MCNC: 12.1 G/DL (ref 13.5–17.5)
HGB UR QL STRIP.AUTO: NEGATIVE
HYALINE CASTS #/AREA URNS AUTO: 2 /LPF (ref 0–8)
KETONES UR STRIP.AUTO-MCNC: NEGATIVE MG/DL
LEUKOCYTE ESTERASE UR QL STRIP.AUTO: NEGATIVE
MCH RBC QN AUTO: 31.4 PG (ref 26–34)
MCHC RBC AUTO-ENTMCNC: 34.6 G/DL (ref 31–36)
MCV RBC AUTO: 90.8 FL (ref 80–100)
NITRITE UR QL STRIP.AUTO: NEGATIVE
PH UR STRIP.AUTO: 5.5 [PH] (ref 5–8)
PHOSPHATE SERPL-MCNC: 4.2 MG/DL (ref 2.5–4.9)
PLATELET # BLD AUTO: 260 K/UL (ref 135–450)
PMV BLD AUTO: 8.1 FL (ref 5–10.5)
POTASSIUM SERPL-SCNC: 4 MMOL/L (ref 3.5–5.1)
PROT UR STRIP.AUTO-MCNC: 100 MG/DL
PROT UR-MCNC: 87 MG/DL
PROT/CREAT UR-RTO: 0.8 MG/DL
RBC # BLD AUTO: 3.86 M/UL (ref 4.2–5.9)
RBC CLUMPS #/AREA URNS AUTO: 0 /HPF (ref 0–4)
SODIUM SERPL-SCNC: 145 MMOL/L (ref 136–145)
SP GR UR STRIP.AUTO: 1.02 (ref 1–1.03)
UA DIPSTICK W REFLEX MICRO PNL UR: YES
URN SPEC COLLECT METH UR: ABNORMAL
UROBILINOGEN UR STRIP-ACNC: 0.2 E.U./DL
WBC # BLD AUTO: 7.6 K/UL (ref 4–11)
WBC #/AREA URNS AUTO: 1 /HPF (ref 0–5)

## 2024-03-20 DIAGNOSIS — R80.9 PROTEINURIA, UNSPECIFIED TYPE: ICD-10-CM

## 2024-03-20 DIAGNOSIS — N40.0 BENIGN PROSTATIC HYPERPLASIA WITHOUT LOWER URINARY TRACT SYMPTOMS: ICD-10-CM

## 2024-03-20 DIAGNOSIS — I10 ESSENTIAL HYPERTENSION: ICD-10-CM

## 2024-03-20 DIAGNOSIS — E55.9 VITAMIN D DEFICIENCY: ICD-10-CM

## 2024-03-20 DIAGNOSIS — N17.9 ACUTE KIDNEY INJURY SUPERIMPOSED ON CKD (HCC): ICD-10-CM

## 2024-03-20 DIAGNOSIS — N18.9 ACUTE KIDNEY INJURY SUPERIMPOSED ON CKD (HCC): ICD-10-CM

## 2024-03-20 LAB
ALBUMIN SERPL-MCNC: 4.1 G/DL (ref 3.4–5)
AMORPH SED URNS QL MICRO: ABNORMAL /HPF
ANION GAP SERPL CALCULATED.3IONS-SCNC: 12 MMOL/L (ref 3–16)
BILIRUB UR QL STRIP.AUTO: NEGATIVE
BUN SERPL-MCNC: 33 MG/DL (ref 7–20)
CALCIUM SERPL-MCNC: 8.8 MG/DL (ref 8.3–10.6)
CHARACTER UR: ABNORMAL
CHLORIDE SERPL-SCNC: 112 MMOL/L (ref 99–110)
CLARITY UR: CLEAR
CO2 SERPL-SCNC: 20 MMOL/L (ref 21–32)
COLOR UR: YELLOW
CREAT SERPL-MCNC: 2.5 MG/DL (ref 0.8–1.3)
DEPRECATED RDW RBC AUTO: 13.3 % (ref 12.4–15.4)
FINE GRAN CASTS #/AREA URNS HPF: ABNORMAL /LPF (ref 0–2)
GFR SERPLBLD CREATININE-BSD FMLA CKD-EPI: 27 ML/MIN/{1.73_M2}
GLUCOSE SERPL-MCNC: 175 MG/DL (ref 70–99)
GLUCOSE UR STRIP.AUTO-MCNC: 250 MG/DL
HCT VFR BLD AUTO: 33.1 % (ref 40.5–52.5)
HGB BLD-MCNC: 11.3 G/DL (ref 13.5–17.5)
HGB UR QL STRIP.AUTO: ABNORMAL
KETONES UR STRIP.AUTO-MCNC: NEGATIVE MG/DL
LEUKOCYTE ESTERASE UR QL STRIP.AUTO: NEGATIVE
MCH RBC QN AUTO: 30.8 PG (ref 26–34)
MCHC RBC AUTO-ENTMCNC: 34.1 G/DL (ref 31–36)
MCV RBC AUTO: 90.3 FL (ref 80–100)
NITRITE UR QL STRIP.AUTO: NEGATIVE
PH UR STRIP.AUTO: 5.5 [PH] (ref 5–8)
PHOSPHATE SERPL-MCNC: 4.2 MG/DL (ref 2.5–4.9)
PLATELET # BLD AUTO: 245 K/UL (ref 135–450)
PMV BLD AUTO: 8.4 FL (ref 5–10.5)
POTASSIUM SERPL-SCNC: 3.9 MMOL/L (ref 3.5–5.1)
PROT UR STRIP.AUTO-MCNC: 300 MG/DL
RBC # BLD AUTO: 3.67 M/UL (ref 4.2–5.9)
RBC #/AREA URNS HPF: ABNORMAL /HPF (ref 0–4)
SODIUM SERPL-SCNC: 144 MMOL/L (ref 136–145)
SP GR UR STRIP.AUTO: 1.02 (ref 1–1.03)
UA DIPSTICK W REFLEX MICRO PNL UR: YES
URN SPEC COLLECT METH UR: ABNORMAL
UROBILINOGEN UR STRIP-ACNC: 0.2 E.U./DL
WBC # BLD AUTO: 7.3 K/UL (ref 4–11)
WBC #/AREA URNS HPF: ABNORMAL /HPF (ref 0–5)

## 2024-03-21 LAB
CREAT UR-MCNC: 182.6 MG/DL (ref 39–259)
PROT UR-MCNC: 174 MG/DL
PROT/CREAT UR-RTO: 1 MG/DL

## 2024-04-15 DIAGNOSIS — I12.9 TYPE 2 DM WITH CKD STAGE 4 AND HYPERTENSION (HCC): ICD-10-CM

## 2024-04-15 DIAGNOSIS — N40.0 BENIGN PROSTATIC HYPERPLASIA WITHOUT LOWER URINARY TRACT SYMPTOMS: ICD-10-CM

## 2024-04-15 DIAGNOSIS — N18.4 TYPE 2 DM WITH CKD STAGE 4 AND HYPERTENSION (HCC): ICD-10-CM

## 2024-04-15 DIAGNOSIS — I10 ESSENTIAL HYPERTENSION: ICD-10-CM

## 2024-04-15 DIAGNOSIS — E55.9 VITAMIN D DEFICIENCY: ICD-10-CM

## 2024-04-15 DIAGNOSIS — R80.9 PROTEINURIA, UNSPECIFIED TYPE: ICD-10-CM

## 2024-04-15 DIAGNOSIS — E11.22 TYPE 2 DM WITH CKD STAGE 4 AND HYPERTENSION (HCC): ICD-10-CM

## 2024-04-16 LAB
ALBUMIN SERPL-MCNC: 4 G/DL (ref 3.4–5)
ANION GAP SERPL CALCULATED.3IONS-SCNC: 12 MMOL/L (ref 3–16)
BUN SERPL-MCNC: 50 MG/DL (ref 7–20)
CALCIUM SERPL-MCNC: 8.7 MG/DL (ref 8.3–10.6)
CHLORIDE SERPL-SCNC: 116 MMOL/L (ref 99–110)
CO2 SERPL-SCNC: 18 MMOL/L (ref 21–32)
CREAT SERPL-MCNC: 3.4 MG/DL (ref 0.8–1.3)
GFR SERPLBLD CREATININE-BSD FMLA CKD-EPI: 18 ML/MIN/{1.73_M2}
GLUCOSE SERPL-MCNC: 186 MG/DL (ref 70–99)
PHOSPHATE SERPL-MCNC: 4.6 MG/DL (ref 2.5–4.9)
POTASSIUM SERPL-SCNC: 4.8 MMOL/L (ref 3.5–5.1)
SODIUM SERPL-SCNC: 146 MMOL/L (ref 136–145)

## 2024-05-17 DIAGNOSIS — E55.9 VITAMIN D DEFICIENCY: ICD-10-CM

## 2024-05-17 DIAGNOSIS — N18.4 TYPE 2 DM WITH CKD STAGE 4 AND HYPERTENSION (HCC): ICD-10-CM

## 2024-05-17 DIAGNOSIS — R80.9 PROTEINURIA, UNSPECIFIED TYPE: ICD-10-CM

## 2024-05-17 DIAGNOSIS — N40.0 BENIGN PROSTATIC HYPERPLASIA WITHOUT LOWER URINARY TRACT SYMPTOMS: ICD-10-CM

## 2024-05-17 DIAGNOSIS — I10 ESSENTIAL HYPERTENSION: ICD-10-CM

## 2024-05-17 DIAGNOSIS — E11.22 TYPE 2 DM WITH CKD STAGE 4 AND HYPERTENSION (HCC): ICD-10-CM

## 2024-05-17 DIAGNOSIS — I12.9 TYPE 2 DM WITH CKD STAGE 4 AND HYPERTENSION (HCC): ICD-10-CM

## 2024-05-17 LAB
25(OH)D3 SERPL-MCNC: 30.7 NG/ML
ALBUMIN SERPL-MCNC: 4 G/DL (ref 3.4–5)
ANION GAP SERPL CALCULATED.3IONS-SCNC: 14 MMOL/L (ref 3–16)
BACTERIA URNS QL MICRO: ABNORMAL /HPF
BILIRUB UR QL STRIP.AUTO: NEGATIVE
BUN SERPL-MCNC: 55 MG/DL (ref 7–20)
CALCIUM SERPL-MCNC: 8.5 MG/DL (ref 8.3–10.6)
CHLORIDE SERPL-SCNC: 113 MMOL/L (ref 99–110)
CLARITY UR: CLEAR
CO2 SERPL-SCNC: 17 MMOL/L (ref 21–32)
COLOR UR: YELLOW
CREAT SERPL-MCNC: 3.2 MG/DL (ref 0.8–1.3)
CREAT UR-MCNC: 122.3 MG/DL (ref 39–259)
CREAT UR-MCNC: 126.3 MG/DL (ref 39–259)
DEPRECATED RDW RBC AUTO: 13.1 % (ref 12.4–15.4)
EPI CELLS #/AREA URNS AUTO: 1 /HPF (ref 0–5)
GFR SERPLBLD CREATININE-BSD FMLA CKD-EPI: 20 ML/MIN/{1.73_M2}
GLUCOSE SERPL-MCNC: 127 MG/DL (ref 70–99)
GLUCOSE UR STRIP.AUTO-MCNC: NEGATIVE MG/DL
HCT VFR BLD AUTO: 31.1 % (ref 40.5–52.5)
HGB BLD-MCNC: 10.3 G/DL (ref 13.5–17.5)
HGB UR QL STRIP.AUTO: NEGATIVE
HYALINE CASTS #/AREA URNS AUTO: 0 /LPF (ref 0–8)
KETONES UR STRIP.AUTO-MCNC: NEGATIVE MG/DL
LEUKOCYTE ESTERASE UR QL STRIP.AUTO: NEGATIVE
MCH RBC QN AUTO: 29.9 PG (ref 26–34)
MCHC RBC AUTO-ENTMCNC: 33.1 G/DL (ref 31–36)
MCV RBC AUTO: 90.5 FL (ref 80–100)
MICROALBUMIN UR DL<=1MG/L-MCNC: 13.8 MG/DL
MICROALBUMIN/CREAT UR: 109.3 MG/G (ref 0–30)
NITRITE UR QL STRIP.AUTO: NEGATIVE
PH UR STRIP.AUTO: 5.5 [PH] (ref 5–8)
PHOSPHATE SERPL-MCNC: 4.5 MG/DL (ref 2.5–4.9)
PLATELET # BLD AUTO: 235 K/UL (ref 135–450)
PMV BLD AUTO: 9 FL (ref 5–10.5)
POTASSIUM SERPL-SCNC: 4.5 MMOL/L (ref 3.5–5.1)
PROT UR STRIP.AUTO-MCNC: 30 MG/DL
PROT UR-MCNC: 43 MG/DL
PROT/CREAT UR-RTO: 0.4 MG/DL
PTH-INTACT SERPL-MCNC: 70.8 PG/ML (ref 14–72)
RBC # BLD AUTO: 3.43 M/UL (ref 4.2–5.9)
RBC CLUMPS #/AREA URNS AUTO: 0 /HPF (ref 0–4)
SODIUM SERPL-SCNC: 144 MMOL/L (ref 136–145)
SP GR UR STRIP.AUTO: 1.02 (ref 1–1.03)
UA DIPSTICK W REFLEX MICRO PNL UR: YES
URN SPEC COLLECT METH UR: ABNORMAL
UROBILINOGEN UR STRIP-ACNC: 0.2 E.U./DL
WBC # BLD AUTO: 7.1 K/UL (ref 4–11)
WBC #/AREA URNS AUTO: 1 /HPF (ref 0–5)

## 2024-06-28 DIAGNOSIS — I12.9 TYPE 2 DIABETES MELLITUS WITH STAGE 3 CHRONIC KIDNEY DISEASE AND HYPERTENSION (HCC): ICD-10-CM

## 2024-06-28 DIAGNOSIS — E55.9 VITAMIN D DEFICIENCY: ICD-10-CM

## 2024-06-28 DIAGNOSIS — E87.20 METABOLIC ACIDOSIS: ICD-10-CM

## 2024-06-28 DIAGNOSIS — R80.9 PROTEINURIA, UNSPECIFIED TYPE: ICD-10-CM

## 2024-06-28 DIAGNOSIS — N18.4 CHRONIC KIDNEY DISEASE, STAGE 4 (SEVERE) (HCC): ICD-10-CM

## 2024-06-28 DIAGNOSIS — N18.30 TYPE 2 DIABETES MELLITUS WITH STAGE 3 CHRONIC KIDNEY DISEASE AND HYPERTENSION (HCC): ICD-10-CM

## 2024-06-28 DIAGNOSIS — I10 ESSENTIAL HYPERTENSION: ICD-10-CM

## 2024-06-28 DIAGNOSIS — N18.9 ACUTE KIDNEY INJURY SUPERIMPOSED ON CKD (HCC): ICD-10-CM

## 2024-06-28 DIAGNOSIS — N17.9 ACUTE KIDNEY INJURY SUPERIMPOSED ON CKD (HCC): ICD-10-CM

## 2024-06-28 DIAGNOSIS — E87.6 HYPOKALEMIA: ICD-10-CM

## 2024-06-28 DIAGNOSIS — E11.22 TYPE 2 DIABETES MELLITUS WITH STAGE 3 CHRONIC KIDNEY DISEASE AND HYPERTENSION (HCC): ICD-10-CM

## 2024-06-28 LAB
DEPRECATED RDW RBC AUTO: 14.4 % (ref 12.4–15.4)
HCT VFR BLD AUTO: 33.9 % (ref 40.5–52.5)
HGB BLD-MCNC: 11.3 G/DL (ref 13.5–17.5)
MCH RBC QN AUTO: 30.8 PG (ref 26–34)
MCHC RBC AUTO-ENTMCNC: 33.4 G/DL (ref 31–36)
MCV RBC AUTO: 92.3 FL (ref 80–100)
PLATELET # BLD AUTO: 226 K/UL (ref 135–450)
PMV BLD AUTO: 8.8 FL (ref 5–10.5)
RBC # BLD AUTO: 3.67 M/UL (ref 4.2–5.9)
WBC # BLD AUTO: 7.1 K/UL (ref 4–11)

## 2024-06-29 LAB
ALBUMIN SERPL-MCNC: 4.2 G/DL (ref 3.4–5)
ANION GAP SERPL CALCULATED.3IONS-SCNC: 12 MMOL/L (ref 3–16)
BUN SERPL-MCNC: 45 MG/DL (ref 7–20)
CALCIUM SERPL-MCNC: 9.1 MG/DL (ref 8.3–10.6)
CHLORIDE SERPL-SCNC: 108 MMOL/L (ref 99–110)
CO2 SERPL-SCNC: 25 MMOL/L (ref 21–32)
CREAT SERPL-MCNC: 3.1 MG/DL (ref 0.8–1.3)
GFR SERPLBLD CREATININE-BSD FMLA CKD-EPI: 20 ML/MIN/{1.73_M2}
GLUCOSE SERPL-MCNC: 68 MG/DL (ref 70–99)
MAGNESIUM SERPL-MCNC: 2.8 MG/DL (ref 1.8–2.4)
PHOSPHATE SERPL-MCNC: 4.6 MG/DL (ref 2.5–4.9)
POTASSIUM SERPL-SCNC: 4.3 MMOL/L (ref 3.5–5.1)
SODIUM SERPL-SCNC: 145 MMOL/L (ref 136–145)

## (undated) DEVICE — SYRINGE MED 30ML STD CLR PLAS LUERLOCK TIP N CTRL DISP

## (undated) DEVICE — Device: Brand: COVER, PERINEAL POST, 12 PK

## (undated) DEVICE — DRAPE,U/ SHT,SPLIT,PLAS,STERIL: Brand: MEDLINE

## (undated) DEVICE — SUTURE VCRL + SZ 2-0 L18IN ABSRB UD CT1 L36MM 1/2 CIR VCP839D

## (undated) DEVICE — 450 ML BOTTLE OF 0.05% CHLORHEXIDINE GLUCONATE IN 99.95% STERILE WATER FOR IRRIGATION, USP AND APPLICATOR.: Brand: IRRISEPT ANTIMICROBIAL WOUND LAVAGE

## (undated) DEVICE — BANDAGE COBAN 4 IN COMPR W4INXL5YD FOAM COHESIVE QUIK STK SELF ADH SFT

## (undated) DEVICE — MAT FLR ABS DISP

## (undated) DEVICE — RESTRAINT EXT ANK WRST LT BLU FOAM 2 STRP SIDE BCKL HK AND

## (undated) DEVICE — BIT DRL L30MM DIA3.2MM DISP FOR G7 2 MOBILITY CONSTRUCT

## (undated) DEVICE — COVER,MAYO STAND,XL,STERILE: Brand: MEDLINE

## (undated) DEVICE — SYSTEM SKIN CLSR 22CM DERMBND PRINEO

## (undated) DEVICE — SUTURE ETHBND EXCEL SZ 2 L30IN NONABSORBABLE GRN L40MM V-37 MX69G

## (undated) DEVICE — PAD,NON-ADHERENT,3X8,STERILE,LF,1/PK: Brand: MEDLINE

## (undated) DEVICE — SUTURE VCRL + SZ 0 L18IN ABSRB UD L36MM CT-1 1/2 CIR VCP840D

## (undated) DEVICE — SYRINGE MED 10ML TRNSLUC BRL PLUNG BLK MRK POLYPR CTRL

## (undated) DEVICE — C-ARM: Brand: UNBRANDED

## (undated) DEVICE — PADDING UNDERCAST W4INXL4YD 100% COT CRIMPED FINISH WBRL II

## (undated) DEVICE — YANKAUER OPEN TIP, NO VENT: Brand: ARGYLE

## (undated) DEVICE — DECANTER FLD 9IN ST BG FOR ASEP TRNSF OF FLD

## (undated) DEVICE — Z DISCONTINUED USE 2272117 DRAPE SURG 3 QTR N INVASIVE 2 LAYR DISP

## (undated) DEVICE — SUTURE STRATAFIX SZ 1 L14IN ABSRB VLT L36MM MO-4 TAPERPOINT SXPD2B400

## (undated) DEVICE — STRYKER PERFORMANCE SERIES SAGITTAL BLADE: Brand: STRYKER PERFORMANCE SERIES

## (undated) DEVICE — HOOD: Brand: FLYTE

## (undated) DEVICE — CIRCUIT VENTILATOR 2 LIMB AD 72 IN 22 MM CONVENTIONAL N HEAT

## (undated) DEVICE — BIPOLAR SEALER 23-112-1 AQM 6.0: Brand: AQUAMANTYS ®

## (undated) DEVICE — APPLICATOR MEDICATED 26 CC SOLUTION HI LT ORNG CHLORAPREP

## (undated) DEVICE — BLANKET WRM W29.9XL79.1IN UP BODY FORC AIR MISTRAL-AIR

## (undated) DEVICE — CONTAINER,SPECIMEN,OR STERILE,4OZ: Brand: MEDLINE

## (undated) DEVICE — HANDPIECE SET WITH HIGH FLOW TIP AND SUCTION TUBE: Brand: INTERPULSE

## (undated) DEVICE — HOOD, PEEL-AWAY: Brand: FLYTE

## (undated) DEVICE — DRAPE UTIL W15XL25IN FAB NONFENESTRATED NONREINFORCED LO

## (undated) DEVICE — GLOVE SURG SZ 8.5 L11.85IN FNGR THK9.8MIL STRW LTX POLYMER

## (undated) DEVICE — COVER LT HNDL BLU PLAS

## (undated) DEVICE — SUTURE MCRYL + SZ 3-0 L27IN ABSRB UD PS1 L24MM 3/8 CIR REV MCP936H

## (undated) DEVICE — SPONGE LAP W18XL18IN WHT COT 4 PLY FLD STRUNG RADPQ DISP ST

## (undated) DEVICE — PACK SURG HIP ASSESSORY

## (undated) DEVICE — MAJOR SET UP PK

## (undated) DEVICE — 3M™ IOBAN™ 2 ANTIMICROBIAL INCISE DRAPE 6650EZ: Brand: IOBAN™ 2

## (undated) DEVICE — MERCY FAIRFIELD TURNOVER KIT: Brand: MEDLINE INDUSTRIES, INC.

## (undated) DEVICE — POSITIONER,HEAD,RING CUSHION,9IN,32CS: Brand: MEDLINE

## (undated) DEVICE — PAD ABSRB W8XL10IN ABD HYDROPHOBIC NONWOVEN THCK LAYR CELOS

## (undated) DEVICE — DRESSING CNTCT 4X12IN IS THERABOND 3D

## (undated) DEVICE — SUTURE VCRL SZ 1 L27IN ABSRB VLT CTX L48MM 1 2 CIR SGL ARMED J365H

## (undated) DEVICE — 3M™ TEGADERM™ TRANSPARENT FILM DRESSING FRAME STYLE, 1627, 4 IN X 10 IN (10 CM X 25 CM), 20/CT 4CT/CASE: Brand: 3M™ TEGADERM™

## (undated) DEVICE — PEN: MARKING STD 100/CS: Brand: MEDICAL ACTION INDUSTRIES

## (undated) DEVICE — SOLUTION IRRIG 2000ML 0.9% SOD CHL USP UROMATIC PLAS CONT

## (undated) DEVICE — STANDARD HYPODERMIC NEEDLE,POLYPROPYLENE HUB: Brand: MONOJECT

## (undated) DEVICE — STERILE LATEX POWDER FREE SURGICAL GLOVES WITH HYDROGEL COATING: Brand: PROTEXIS

## (undated) DEVICE — GOWN,AURORA,NONREINF,RAGLAN,XXL,STERILE: Brand: MEDLINE